# Patient Record
Sex: FEMALE | Race: OTHER | HISPANIC OR LATINO | URBAN - METROPOLITAN AREA
[De-identification: names, ages, dates, MRNs, and addresses within clinical notes are randomized per-mention and may not be internally consistent; named-entity substitution may affect disease eponyms.]

---

## 2020-04-30 ENCOUNTER — EMERGENCY (EMERGENCY)
Facility: HOSPITAL | Age: 30
LOS: 1 days | Discharge: HOME | End: 2020-04-30
Attending: EMERGENCY MEDICINE | Admitting: EMERGENCY MEDICINE
Payer: MEDICAID

## 2020-04-30 VITALS
TEMPERATURE: 99 F | OXYGEN SATURATION: 100 % | HEART RATE: 79 BPM | RESPIRATION RATE: 18 BRPM | SYSTOLIC BLOOD PRESSURE: 102 MMHG | DIASTOLIC BLOOD PRESSURE: 66 MMHG

## 2020-04-30 VITALS
TEMPERATURE: 99 F | RESPIRATION RATE: 16 BRPM | DIASTOLIC BLOOD PRESSURE: 61 MMHG | SYSTOLIC BLOOD PRESSURE: 100 MMHG | HEART RATE: 82 BPM | OXYGEN SATURATION: 100 %

## 2020-04-30 DIAGNOSIS — O26.891 OTHER SPECIFIED PREGNANCY RELATED CONDITIONS, FIRST TRIMESTER: ICD-10-CM

## 2020-04-30 LAB
ALBUMIN SERPL ELPH-MCNC: 4.4 G/DL — SIGNIFICANT CHANGE UP (ref 3.3–5)
ALP SERPL-CCNC: 54 U/L — SIGNIFICANT CHANGE UP (ref 40–120)
ALT FLD-CCNC: 11 U/L — SIGNIFICANT CHANGE UP (ref 4–33)
ANION GAP SERPL CALC-SCNC: 12 MMO/L — SIGNIFICANT CHANGE UP (ref 7–14)
APPEARANCE UR: CLEAR — SIGNIFICANT CHANGE UP
AST SERPL-CCNC: 16 U/L — SIGNIFICANT CHANGE UP (ref 4–32)
BASOPHILS # BLD AUTO: 0.02 K/UL — SIGNIFICANT CHANGE UP (ref 0–0.2)
BASOPHILS NFR BLD AUTO: 0.3 % — SIGNIFICANT CHANGE UP (ref 0–2)
BILIRUB SERPL-MCNC: 1 MG/DL — SIGNIFICANT CHANGE UP (ref 0.2–1.2)
BILIRUB UR-MCNC: NEGATIVE — SIGNIFICANT CHANGE UP
BLD GP AB SCN SERPL QL: NEGATIVE — SIGNIFICANT CHANGE UP
BLOOD UR QL VISUAL: NEGATIVE — SIGNIFICANT CHANGE UP
BUN SERPL-MCNC: 9 MG/DL — SIGNIFICANT CHANGE UP (ref 7–23)
CALCIUM SERPL-MCNC: 9.4 MG/DL — SIGNIFICANT CHANGE UP (ref 8.4–10.5)
CHLORIDE SERPL-SCNC: 103 MMOL/L — SIGNIFICANT CHANGE UP (ref 98–107)
CO2 SERPL-SCNC: 22 MMOL/L — SIGNIFICANT CHANGE UP (ref 22–31)
COLOR SPEC: YELLOW — SIGNIFICANT CHANGE UP
CREAT SERPL-MCNC: 0.63 MG/DL — SIGNIFICANT CHANGE UP (ref 0.5–1.3)
EOSINOPHIL # BLD AUTO: 0.05 K/UL — SIGNIFICANT CHANGE UP (ref 0–0.5)
EOSINOPHIL NFR BLD AUTO: 0.7 % — SIGNIFICANT CHANGE UP (ref 0–6)
GLUCOSE SERPL-MCNC: 63 MG/DL — LOW (ref 70–99)
GLUCOSE UR-MCNC: NEGATIVE — SIGNIFICANT CHANGE UP
HCG SERPL-ACNC: 8895 MIU/ML — SIGNIFICANT CHANGE UP
HCT VFR BLD CALC: 40.9 % — SIGNIFICANT CHANGE UP (ref 34.5–45)
HGB BLD-MCNC: 13.8 G/DL — SIGNIFICANT CHANGE UP (ref 11.5–15.5)
IMM GRANULOCYTES NFR BLD AUTO: 0.4 % — SIGNIFICANT CHANGE UP (ref 0–1.5)
KETONES UR-MCNC: NEGATIVE — SIGNIFICANT CHANGE UP
LEUKOCYTE ESTERASE UR-ACNC: NEGATIVE — SIGNIFICANT CHANGE UP
LYMPHOCYTES # BLD AUTO: 2.19 K/UL — SIGNIFICANT CHANGE UP (ref 1–3.3)
LYMPHOCYTES # BLD AUTO: 32.2 % — SIGNIFICANT CHANGE UP (ref 13–44)
MCHC RBC-ENTMCNC: 28.8 PG — SIGNIFICANT CHANGE UP (ref 27–34)
MCHC RBC-ENTMCNC: 33.7 % — SIGNIFICANT CHANGE UP (ref 32–36)
MCV RBC AUTO: 85.2 FL — SIGNIFICANT CHANGE UP (ref 80–100)
MONOCYTES # BLD AUTO: 0.46 K/UL — SIGNIFICANT CHANGE UP (ref 0–0.9)
MONOCYTES NFR BLD AUTO: 6.8 % — SIGNIFICANT CHANGE UP (ref 2–14)
NEUTROPHILS # BLD AUTO: 4.06 K/UL — SIGNIFICANT CHANGE UP (ref 1.8–7.4)
NEUTROPHILS NFR BLD AUTO: 59.6 % — SIGNIFICANT CHANGE UP (ref 43–77)
NITRITE UR-MCNC: NEGATIVE — SIGNIFICANT CHANGE UP
NRBC # FLD: 0 K/UL — SIGNIFICANT CHANGE UP (ref 0–0)
PH UR: 7 — SIGNIFICANT CHANGE UP (ref 5–8)
PLATELET # BLD AUTO: 224 K/UL — SIGNIFICANT CHANGE UP (ref 150–400)
PMV BLD: 9.9 FL — SIGNIFICANT CHANGE UP (ref 7–13)
POTASSIUM SERPL-MCNC: 3.9 MMOL/L — SIGNIFICANT CHANGE UP (ref 3.5–5.3)
POTASSIUM SERPL-SCNC: 3.9 MMOL/L — SIGNIFICANT CHANGE UP (ref 3.5–5.3)
PROT SERPL-MCNC: 7.1 G/DL — SIGNIFICANT CHANGE UP (ref 6–8.3)
PROT UR-MCNC: 10 — SIGNIFICANT CHANGE UP
RBC # BLD: 4.8 M/UL — SIGNIFICANT CHANGE UP (ref 3.8–5.2)
RBC # FLD: 12.7 % — SIGNIFICANT CHANGE UP (ref 10.3–14.5)
RH IG SCN BLD-IMP: POSITIVE — SIGNIFICANT CHANGE UP
SODIUM SERPL-SCNC: 137 MMOL/L — SIGNIFICANT CHANGE UP (ref 135–145)
SP GR SPEC: 1.02 — SIGNIFICANT CHANGE UP (ref 1–1.04)
UROBILINOGEN FLD QL: NORMAL — SIGNIFICANT CHANGE UP
WBC # BLD: 6.81 K/UL — SIGNIFICANT CHANGE UP (ref 3.8–10.5)
WBC # FLD AUTO: 6.81 K/UL — SIGNIFICANT CHANGE UP (ref 3.8–10.5)

## 2020-04-30 PROCEDURE — 76830 TRANSVAGINAL US NON-OB: CPT | Mod: 26

## 2020-04-30 PROCEDURE — 99284 EMERGENCY DEPT VISIT MOD MDM: CPT

## 2020-04-30 RX ORDER — ACETAMINOPHEN 500 MG
975 TABLET ORAL ONCE
Refills: 0 | Status: COMPLETED | OUTPATIENT
Start: 2020-04-30 | End: 2020-04-30

## 2020-04-30 RX ADMIN — Medication 975 MILLIGRAM(S): at 14:35

## 2020-04-30 NOTE — CONSULT NOTE ADULT - SUBJECTIVE AND OBJECTIVE BOX
Gyn Consult Note    DMITRI MORRIS  29y  Female [id number]    HPI: 30 y/o Belarusian speaking , LMP 3/24, at 5w2d by LMP, presenting with pelvic cramping in the setting of positive home pregnancy test. Patient reports two days of menstrual cramps which was concerning because she had a positive pregnancy test at home and highly desires the pregnancy. Patient has not yet established care with an OB/GYN. She denies fevers, chills, nausea, vomiting, chest pain, shortness of breath, dizziness, urinary symptoms, changes in bowel movements, urinary symptoms, vaginal bleeding, purulent vaginal discharge.     Name of GYN Physician: none currently    OBHx:  denies  GYNHx: Denies fibroids, cysts, endometriosis, STI's, Abnormal pap smears   PMH: denies  PSH: denies  Meds: PNVs  All: NKDA  Soc: no substance use, anxiety/depression    accepts blood    Vital Signs Last 24 Hrs  T(C): 37.2 (2020 18:10), Max: 37.4 (2020 14:03)  T(F): 98.9 (2020 18:10), Max: 99.4 (2020 14:03)  HR: 79 (2020 18:10) (79 - 82)  BP: 102/66 (2020 18:10) (100/61 - 102/66)  BP(mean): --  RR: 18 (2020 18:10) (16 - 18)  SpO2: 100% (2020 18:10) (100% - 100%)    Physical Exam:   General: sitting comfortably in bed, NAD   CV: RRR  Lungs: CTAB  Abd: Soft, non-tender, non-distended.  :  External labia wnl.  Bimanual exam with no cervical motion tenderness, uterus wnl, adnexa non palpable b/l.  Cervix closed, no blood on glove  Ext: non-tender b/l, no edema     LABS:                              13.8   6.81  )-----------( 224      ( 2020 14:25 )             40.9     04-30    137  |  103  |  9   ----------------------------<  63<L>  3.9   |  22  |  0.63    Ca    9.4      2020 14:25    TPro  7.1  /  Alb  4.4  /  TBili  1.0  /  DBili  x   /  AST  16  /  ALT  11  /  AlkPhos  54      I&O's Detail      Urinalysis Basic - ( 2020 14:16 )    Color: YELLOW / Appearance: CLEAR / S.024 / pH: 7.0  Gluc: NEGATIVE / Ketone: NEGATIVE  / Bili: NEGATIVE / Urobili: NORMAL   Blood: NEGATIVE / Protein: 10 / Nitrite: NEGATIVE   Leuk Esterase: NEGATIVE / RBC: x / WBC x   Sq Epi: x / Non Sq Epi: x / Bacteria: x    < from: US Transvaginal (20 @ 15:23) >    EXAM:  US TRANSVAGINAL        PROCEDURE DATE:  2020         INTERPRETATION:  CLINICAL INFORMATION: Lower abdominal pain for 2 days, dysuria and urinary frequency. Beta hCG greater than 8000.    LMP: 3/24/2020.  Estimated Gestational Age by LMP: 5 weeks 2 days.    COMPARISON: None available.    TECHNIQUE: Endovaginal pelvic sonogram.     FINDINGS:    Uterus:  A cystic structure within the endometrial cavity measures 1.0 x 0.4 x 1.2 cm, most likely representing an gestational sac.  No definite yolk sac or fetal pole is present is visualized at this time.    Right ovary: 2.2 x 2.0 x 1.5 cm. Within normal limits.  Left ovary: 3.5 x 2.0 x 2.0 cm. A 2.7 cm corpus luteum noted.  Free fluid:None.    IMPRESSION:    A cystic structure in the endometrial cavity likely representing an early gestational sac. No yolk sac or fetal pole visualized at this time. Recommend close clinical follow-up and serial beta hCGs to assess for viability.    < end of copied text >

## 2020-04-30 NOTE — ED PROVIDER NOTE - CLINICAL SUMMARY MEDICAL DECISION MAKING FREE TEXT BOX
30 y/o female, 5 weeks pregnant w/ lower abd pain x2 days- threatened ab vs ectopic vs uti- labs, ua, US, reassess 28 y/o female, 5 weeks pregnant w/ lower abd pain x2 days- threatened ab vs ectopic vs uti- labs, ua, US, reassess    Haetsephaniajeyson: pregnant 5 weeks by dates, quant nearly 9k.  my exam shows tender left adnexa. uterus, right side adnexa non tender, bladder and right and left vaginal walls non-tender.  high quant, us done, shows "possible gestational sac" but concerned b/c tender left side, small possible clot in us uterus, no yolk sac, no hr with that level quant.    gyn consulted.

## 2020-04-30 NOTE — ED PROVIDER NOTE - NSFOLLOWUPCLINICS_GEN_ALL_ED_FT
[facility]  OB/GYN & Surg  [phone] [place] [address]  Phone: [phone]  Fax:   Follow Up Time:

## 2020-04-30 NOTE — ED PROVIDER NOTE - ATTENDING CONTRIBUTION TO CARE
I performed a history and physical exam of the patient and discussed their management with the resident and /or advanced care provider. I reviewed the resident and /or ACP's note and agree with the documented findings and plan of care. My medical decison making and observations are found above.    Caio: pregnant 5 weeks by dates, quant nearly 9k.  my exam shows tender left adnexa. uterus, right side adnexa non tender, bladder and right and left vaginal walls non-tender.  high quant, us done, shows "possible gestational sac" but concerned b/c tender left side, small possible clot in us uterus, no yolk sac, no hr with that level quant.    gyn consulted.

## 2020-04-30 NOTE — ED PROVIDER NOTE - OBJECTIVE STATEMENT
30 y/o 5 weeks pregnant, LMP March 24th c/o lower abd pain x2 days. Pt admits to intermittent lower abd pain, no aggravating or relieving factors. Pt also admits to dysuria and urinary frequency. Pt denies taking any OTC meds. Denies chest pain, sob, cough, n/v/d, vaginal bleeding, numbness, tingling, weakness, dizziness, syncope, fever or chills.

## 2020-04-30 NOTE — ED ADULT TRIAGE NOTE - CHIEF COMPLAINT QUOTE
Approx 5 weeks pregnant c/o mid-pelvic pain since yesterday, has not yet had US. No vag bleeding/ dizziness/ N/V/D/ urinary symptoms.

## 2020-04-30 NOTE — CONSULT NOTE ADULT - ASSESSMENT
28 y/o Bulgarian speaking , LMP 3/24, at 5w2d by LMP, presenting with pelvic cramping in the setting of positive home pregnancy test. VSS. H/h wnl. [facility]. TVUS with gestational sac. Yolk sac also visible - reviewed with GYN service attending. No vaginal bleeding, cervix closed. No lateral pelvic pain, no free fluid on sono. Possible threatened .

## 2020-04-30 NOTE — ED PROVIDER NOTE - PATIENT PORTAL LINK FT
You can access the FollowMyHealth Patient Portal offered by [facility] by registering at the following website: [email or web address]. By joining [facility]’s FollowMyHealth portal, you will also be able to view your health information using other applications (apps) compatible with our system. You can access the FollowMyHealth Patient Portal offered by [facility] by registering at the following website: [email or web address]. By joining [facility]’s FollowMyHealth portal, you will also be able to view your health information using other applications (apps) compatible with our system.

## 2020-04-30 NOTE — ED PROVIDER NOTE - PROGRESS NOTE DETAILS
TOM Alarcon- Pt seen and eval by GYN, cleared for dc with outpatient f/u in their clinic for repeat beta and US. Pt si stable for dc.

## 2020-05-01 PROBLEM — Z78.9 OTHER SPECIFIED HEALTH STATUS: Chronic | Status: ACTIVE | Noted: 2020-04-30

## 2020-05-05 ENCOUNTER — LABORATORY RESULT (OUTPATIENT)
Age: 30
End: 2020-05-05

## 2020-05-05 ENCOUNTER — OUTPATIENT (OUTPATIENT)
Dept: OUTPATIENT SERVICES | Facility: HOSPITAL | Age: 30
LOS: 1 days | End: 2020-05-05

## 2020-05-05 ENCOUNTER — APPOINTMENT (OUTPATIENT)
Dept: OBGYN | Facility: HOSPITAL | Age: 30
End: 2020-05-05
Payer: SELF-PAY

## 2020-05-05 VITALS
HEIGHT: 63 IN | HEART RATE: 73 BPM | WEIGHT: 118 LBS | TEMPERATURE: 98.1 F | DIASTOLIC BLOOD PRESSURE: 60 MMHG | SYSTOLIC BLOOD PRESSURE: 102 MMHG | BODY MASS INDEX: 20.91 KG/M2

## 2020-05-05 DIAGNOSIS — Z98.890 OTHER SPECIFIED POSTPROCEDURAL STATES: ICD-10-CM

## 2020-05-05 LAB — HCG SERPL-ACNC: SIGNIFICANT CHANGE UP MIU/ML

## 2020-05-05 PROCEDURE — 99213 OFFICE O/P EST LOW 20 MIN: CPT | Mod: GC

## 2020-05-06 DIAGNOSIS — Z98.890 OTHER SPECIFIED POSTPROCEDURAL STATES: ICD-10-CM

## 2020-05-06 DIAGNOSIS — Z34.90 ENCOUNTER FOR SUPERVISION OF NORMAL PREGNANCY, UNSPECIFIED, UNSPECIFIED TRIMESTER: ICD-10-CM

## 2020-05-07 NOTE — PHYSICAL EXAM
[No Lesions] : no genitalia lesions [Labia Majora] : labia major [Normal] : vagina [Labia Minora] : labia minora [Vulvar Atrophy] : no vulvar atrophy

## 2020-05-07 NOTE — PROCEDURE
[Intrauterine Pregnancy] : intrauterine pregnancy [Yolk Sac] : yolk sac present [Fetal Heart] : fetal heart present [Current GA by Sonogram: ___ (wks)] : Current GA by Sonogram: [unfilled]Uwks [Date: ___] : EDC: [unfilled] [CRL: ___ (mm)] : CRL - [unfilled]Umm [___ day(s)] : [unfilled] days [WNL] : Transvaginal OB Sonogram WNL [FreeTextEntry1] : LMP 3/24; EDC 12/29/2020 based on LMP, consistent with early first trimester ultrasound

## 2020-05-07 NOTE — CHIEF COMPLAINT
[FreeTextEntry1] : Patient is a 29 year old , LMP  who presents for follow up from ED for abdominal pain. Serum bHCG at that time on  was 8895, TVUS showed likely IUP with visualized gestational and yolk sac. bHCG today is 12578 and TVUS shows well formed yolk sac, fetal pole, and FHR of 134 bmp. This is a  desired pregnancy. She denies any current abdominal pain, vaginal bleeding, nausea, vomiting. She has been taking prenatal vitamins. \par \par

## 2020-05-07 NOTE — REVIEW OF SYSTEMS
[Chills] : no chills [Dyspnea] : no shortness of breath [Abdominal Pain] : no abdominal pain [Dysuria] : no dysuria [Abn Vag Bleeding] : no abnormal vaginal bleeding [Pelvic Pain] : no pelvic pain [Urgency] : no urgency

## 2020-06-02 ENCOUNTER — NON-APPOINTMENT (OUTPATIENT)
Age: 30
End: 2020-06-02

## 2020-06-02 ENCOUNTER — LABORATORY RESULT (OUTPATIENT)
Age: 30
End: 2020-06-02

## 2020-06-02 ENCOUNTER — OUTPATIENT (OUTPATIENT)
Dept: OUTPATIENT SERVICES | Facility: HOSPITAL | Age: 30
LOS: 1 days | End: 2020-06-02
Payer: MEDICAID

## 2020-06-02 ENCOUNTER — RESULT REVIEW (OUTPATIENT)
Age: 30
End: 2020-06-02

## 2020-06-02 ENCOUNTER — APPOINTMENT (OUTPATIENT)
Dept: OBGYN | Facility: HOSPITAL | Age: 30
End: 2020-06-02

## 2020-06-02 VITALS
SYSTOLIC BLOOD PRESSURE: 111 MMHG | BODY MASS INDEX: 21.97 KG/M2 | HEIGHT: 63 IN | DIASTOLIC BLOOD PRESSURE: 53 MMHG | WEIGHT: 124 LBS | TEMPERATURE: 98.2 F | HEART RATE: 81 BPM

## 2020-06-02 DIAGNOSIS — Z78.9 OTHER SPECIFIED HEALTH STATUS: ICD-10-CM

## 2020-06-02 DIAGNOSIS — Z83.49 FAMILY HISTORY OF OTHER ENDOCRINE, NUTRITIONAL AND METABOLIC DISEASES: ICD-10-CM

## 2020-06-02 DIAGNOSIS — Z82.61 FAMILY HISTORY OF ARTHRITIS: ICD-10-CM

## 2020-06-02 DIAGNOSIS — Z82.49 FAMILY HISTORY OF ISCHEMIC HEART DISEASE AND OTHER DISEASES OF THE CIRCULATORY SYSTEM: ICD-10-CM

## 2020-06-02 LAB
24R-OH-CALCIDIOL SERPL-MCNC: 28.7 NG/ML — LOW (ref 30–80)
APPEARANCE UR: CLEAR — SIGNIFICANT CHANGE UP
BILIRUB UR-MCNC: NEGATIVE — SIGNIFICANT CHANGE UP
BLOOD UR QL VISUAL: NEGATIVE — SIGNIFICANT CHANGE UP
COLOR SPEC: COLORLESS — SIGNIFICANT CHANGE UP
GLUCOSE UR-MCNC: NEGATIVE — SIGNIFICANT CHANGE UP
HBV SURFACE AG SER-ACNC: NEGATIVE — SIGNIFICANT CHANGE UP
KETONES UR-MCNC: NEGATIVE — SIGNIFICANT CHANGE UP
LEUKOCYTE ESTERASE UR-ACNC: NEGATIVE — SIGNIFICANT CHANGE UP
NITRITE UR-MCNC: NEGATIVE — SIGNIFICANT CHANGE UP
PH UR: 6.5 — SIGNIFICANT CHANGE UP (ref 5–8)
PROT UR-MCNC: NEGATIVE — SIGNIFICANT CHANGE UP
SP GR SPEC: 1.01 — SIGNIFICANT CHANGE UP (ref 1–1.04)
T4 FREE SERPL-MCNC: 1.39 NG/DL — SIGNIFICANT CHANGE UP (ref 0.9–1.8)
TSH SERPL-MCNC: 3.51 UIU/ML — SIGNIFICANT CHANGE UP (ref 0.27–4.2)
URATE SERPL-MCNC: 3.5 MG/DL — SIGNIFICANT CHANGE UP (ref 2.5–7)
UROBILINOGEN FLD QL: NORMAL — SIGNIFICANT CHANGE UP

## 2020-06-02 PROCEDURE — G0452: CPT | Mod: 26

## 2020-06-02 RX ORDER — FAMOTIDINE 10 MG/1
10 TABLET, FILM COATED ORAL
Qty: 30 | Refills: 1 | Status: COMPLETED | COMMUNITY
Start: 2020-06-02 | End: 2020-07-02

## 2020-06-03 LAB
C TRACH RRNA SPEC QL NAA+PROBE: SIGNIFICANT CHANGE UP
HCV RNA SERPL NAA DL=5-ACNC: NOT DETECTED IU/ML — SIGNIFICANT CHANGE UP
HCV RNA SPEC NAA+PROBE-LOG IU: SIGNIFICANT CHANGE UP
HGB A MFR BLD: 97.2 % — SIGNIFICANT CHANGE UP
HGB A2 MFR BLD: 2.5 % — SIGNIFICANT CHANGE UP (ref 2.4–3.5)
HGB ELECT COMMENT: SIGNIFICANT CHANGE UP
HGB F MFR BLD: < 1 % — SIGNIFICANT CHANGE UP (ref 0–1.5)
HIV 1+2 AB+HIV1 P24 AG SERPL QL IA: SIGNIFICANT CHANGE UP
HPV HIGH+LOW RISK DNA PNL CVX: SIGNIFICANT CHANGE UP
LEAD SERPL-MCNC: < 1 UG/DL — SIGNIFICANT CHANGE UP (ref 0–4)
N GONORRHOEA RRNA SPEC QL NAA+PROBE: SIGNIFICANT CHANGE UP
RUBV IGG SER-ACNC: 6.6 INDEX — SIGNIFICANT CHANGE UP
RUBV IGG SER-IMP: POSITIVE — SIGNIFICANT CHANGE UP
SPECIMEN SOURCE: SIGNIFICANT CHANGE UP
T PALLIDUM AB TITR SER: NEGATIVE — SIGNIFICANT CHANGE UP
VZV IGG FLD QL IA: 37.4 INDEX — SIGNIFICANT CHANGE UP
VZV IGG FLD QL IA: NEGATIVE — SIGNIFICANT CHANGE UP

## 2020-06-04 ENCOUNTER — NON-APPOINTMENT (OUTPATIENT)
Age: 30
End: 2020-06-04

## 2020-06-04 LAB
-  AMIKACIN: SIGNIFICANT CHANGE UP
-  AMPICILLIN/SULBACTAM: SIGNIFICANT CHANGE UP
-  AMPICILLIN: SIGNIFICANT CHANGE UP
-  AZTREONAM: SIGNIFICANT CHANGE UP
-  CEFAZOLIN: SIGNIFICANT CHANGE UP
-  CEFEPIME: SIGNIFICANT CHANGE UP
-  CEFOXITIN: SIGNIFICANT CHANGE UP
-  CEFTRIAXONE: SIGNIFICANT CHANGE UP
-  CIPROFLOXACIN: SIGNIFICANT CHANGE UP
-  GENTAMICIN: SIGNIFICANT CHANGE UP
-  IMIPENEM: SIGNIFICANT CHANGE UP
-  LEVOFLOXACIN: SIGNIFICANT CHANGE UP
-  MEROPENEM: SIGNIFICANT CHANGE UP
-  NITROFURANTOIN: SIGNIFICANT CHANGE UP
-  PIPERACILLIN/TAZOBACTAM: SIGNIFICANT CHANGE UP
-  TIGECYCLINE: SIGNIFICANT CHANGE UP
-  TOBRAMYCIN: SIGNIFICANT CHANGE UP
-  TRIMETHOPRIM/SULFAMETHOXAZOLE: SIGNIFICANT CHANGE UP
CULTURE RESULTS: SIGNIFICANT CHANGE UP
CYTOLOGY SPEC DOC CYTO: SIGNIFICANT CHANGE UP
GAMMA INTERFERON BACKGROUND BLD IA-ACNC: 0.05 IU/ML — SIGNIFICANT CHANGE UP
M TB IFN-G BLD-IMP: NEGATIVE — SIGNIFICANT CHANGE UP
M TB IFN-G CD4+ BCKGRND COR BLD-ACNC: 0 IU/ML — SIGNIFICANT CHANGE UP
M TB IFN-G CD4+CD8+ BCKGRND COR BLD-ACNC: -0.01 IU/ML — SIGNIFICANT CHANGE UP
METHOD TYPE: SIGNIFICANT CHANGE UP
MEV IGM SER-ACNC: NEGATIVE — SIGNIFICANT CHANGE UP
ORGANISM # SPEC MICROSCOPIC CNT: SIGNIFICANT CHANGE UP
ORGANISM # SPEC MICROSCOPIC CNT: SIGNIFICANT CHANGE UP
QUANT TB PLUS MITOGEN MINUS NIL: 7.8 IU/ML — SIGNIFICANT CHANGE UP
SPECIMEN SOURCE: SIGNIFICANT CHANGE UP

## 2020-06-08 LAB — CFTR MUT ANL BLD/T: NEGATIVE — SIGNIFICANT CHANGE UP

## 2020-06-09 DIAGNOSIS — Z34.01 ENCOUNTER FOR SUPERVISION OF NORMAL FIRST PREGNANCY, FIRST TRIMESTER: ICD-10-CM

## 2020-06-09 DIAGNOSIS — Z34.90 ENCOUNTER FOR SUPERVISION OF NORMAL PREGNANCY, UNSPECIFIED, UNSPECIFIED TRIMESTER: ICD-10-CM

## 2020-06-09 DIAGNOSIS — K29.70 GASTRITIS, UNSPECIFIED, WITHOUT BLEEDING: ICD-10-CM

## 2020-06-09 DIAGNOSIS — Z83.49 FAMILY HISTORY OF OTHER ENDOCRINE, NUTRITIONAL AND METABOLIC DISEASES: ICD-10-CM

## 2020-06-18 ENCOUNTER — APPOINTMENT (OUTPATIENT)
Dept: ANTEPARTUM | Facility: CLINIC | Age: 30
End: 2020-06-18
Payer: MEDICAID

## 2020-06-18 ENCOUNTER — LABORATORY RESULT (OUTPATIENT)
Age: 30
End: 2020-06-18

## 2020-06-18 ENCOUNTER — ASOB RESULT (OUTPATIENT)
Age: 30
End: 2020-06-18

## 2020-06-18 PROCEDURE — 76813 OB US NUCHAL MEAS 1 GEST: CPT | Mod: 26

## 2020-06-18 PROCEDURE — 76801 OB US < 14 WKS SINGLE FETUS: CPT | Mod: 26

## 2020-06-22 LAB
1ST TRIMESTER DATA: SIGNIFICANT CHANGE UP
ADDENDUM DOC: SIGNIFICANT CHANGE UP
AFP SERPL-ACNC: SIGNIFICANT CHANGE UP
B-HCG FREE SERPL-MCNC: SIGNIFICANT CHANGE UP
CLINICAL BIOCHEMIST REVIEW: SIGNIFICANT CHANGE UP
CLINICAL BIOCHEMIST REVIEW: SIGNIFICANT CHANGE UP
DEMOGRAPHIC DATA: SIGNIFICANT CHANGE UP
NT: SIGNIFICANT CHANGE UP
PAPP-A SERPL-ACNC: SIGNIFICANT CHANGE UP
SCREEN-FOOTER: SIGNIFICANT CHANGE UP
SCREEN-RECOMMENDATIONS: SIGNIFICANT CHANGE UP

## 2020-06-30 ENCOUNTER — APPOINTMENT (OUTPATIENT)
Dept: OBGYN | Facility: HOSPITAL | Age: 30
End: 2020-06-30

## 2020-06-30 ENCOUNTER — OUTPATIENT (OUTPATIENT)
Dept: OUTPATIENT SERVICES | Facility: HOSPITAL | Age: 30
LOS: 1 days | End: 2020-06-30

## 2020-06-30 ENCOUNTER — NON-APPOINTMENT (OUTPATIENT)
Age: 30
End: 2020-06-30

## 2020-06-30 DIAGNOSIS — Z87.440 PERSONAL HISTORY OF URINARY (TRACT) INFECTIONS: ICD-10-CM

## 2020-07-01 DIAGNOSIS — K29.70 GASTRITIS, UNSPECIFIED, WITHOUT BLEEDING: ICD-10-CM

## 2020-07-01 DIAGNOSIS — N39.0 URINARY TRACT INFECTION, SITE NOT SPECIFIED: ICD-10-CM

## 2020-07-01 DIAGNOSIS — Z34.01 ENCOUNTER FOR SUPERVISION OF NORMAL FIRST PREGNANCY, FIRST TRIMESTER: ICD-10-CM

## 2020-07-22 ENCOUNTER — APPOINTMENT (OUTPATIENT)
Dept: PEDIATRIC MEDICAL GENETICS | Facility: CLINIC | Age: 30
End: 2020-07-22
Payer: MEDICAID

## 2020-07-22 PROCEDURE — 99205 OFFICE O/P NEW HI 60 MIN: CPT | Mod: 95

## 2020-07-24 ENCOUNTER — LABORATORY RESULT (OUTPATIENT)
Age: 30
End: 2020-07-24

## 2020-07-25 NOTE — HISTORY OF PRESENT ILLNESS
[Home] : at home, [unfilled] , at the time of the visit. [Other Location: e.g. Home (Enter Location, City,State)___] : at [unfilled] [Spouse] : spouse [Other:____] : [unfilled]

## 2020-07-28 ENCOUNTER — LABORATORY RESULT (OUTPATIENT)
Age: 30
End: 2020-07-28

## 2020-07-28 ENCOUNTER — APPOINTMENT (OUTPATIENT)
Dept: OBGYN | Facility: HOSPITAL | Age: 30
End: 2020-07-28

## 2020-07-28 ENCOUNTER — OUTPATIENT (OUTPATIENT)
Dept: OUTPATIENT SERVICES | Facility: HOSPITAL | Age: 30
LOS: 1 days | End: 2020-07-28

## 2020-07-28 ENCOUNTER — NON-APPOINTMENT (OUTPATIENT)
Age: 30
End: 2020-07-28

## 2020-07-28 ENCOUNTER — RESULT REVIEW (OUTPATIENT)
Age: 30
End: 2020-07-28

## 2020-07-28 VITALS
DIASTOLIC BLOOD PRESSURE: 65 MMHG | HEART RATE: 80 BPM | SYSTOLIC BLOOD PRESSURE: 98 MMHG | BODY MASS INDEX: 22.67 KG/M2 | WEIGHT: 128 LBS

## 2020-07-28 DIAGNOSIS — R94.6 ABNORMAL RESULTS OF THYROID FUNCTION STUDIES: ICD-10-CM

## 2020-07-28 DIAGNOSIS — Z34.01 ENCOUNTER FOR SUPERVISION OF NORMAL FIRST PREGNANCY, FIRST TRIMESTER: ICD-10-CM

## 2020-07-28 DIAGNOSIS — R79.89 OTHER SPECIFIED ABNORMAL FINDINGS OF BLOOD CHEMISTRY: ICD-10-CM

## 2020-07-28 LAB
SARS-COV-2 IGG SERPL QL IA: NEGATIVE — SIGNIFICANT CHANGE UP
SARS-COV-2 IGM SERPL IA-ACNC: 0.01 INDEX — SIGNIFICANT CHANGE UP
T4 FREE SERPL-MCNC: 1.22 NG/DL — SIGNIFICANT CHANGE UP (ref 0.9–1.8)
TSH SERPL-MCNC: 4.06 UIU/ML — SIGNIFICANT CHANGE UP (ref 0.27–4.2)

## 2020-07-29 LAB
CULTURE RESULTS: SIGNIFICANT CHANGE UP
SPECIMEN SOURCE: SIGNIFICANT CHANGE UP

## 2020-07-31 LAB
1ST TRIMESTER DATA: SIGNIFICANT CHANGE UP
2ND TRIMESTER DATA: SIGNIFICANT CHANGE UP
AFP SERPL-ACNC: SIGNIFICANT CHANGE UP
AFP SERPL-ACNC: SIGNIFICANT CHANGE UP
B-HCG FREE SERPL-MCNC: SIGNIFICANT CHANGE UP
B-HCG FREE SERPL-MCNC: SIGNIFICANT CHANGE UP
CLINICAL BIOCHEMIST REVIEW: SIGNIFICANT CHANGE UP
DEMOGRAPHIC DATA: SIGNIFICANT CHANGE UP
INHIBIN A SERPL-MCNC: SIGNIFICANT CHANGE UP
NT: SIGNIFICANT CHANGE UP
PAPP-A SERPL-ACNC: SIGNIFICANT CHANGE UP
SCREEN-FOOTER: SIGNIFICANT CHANGE UP
U ESTRIOL SERPL-SCNC: SIGNIFICANT CHANGE UP

## 2020-08-11 LAB — MISCELLANEOUS - CHEM: SIGNIFICANT CHANGE UP

## 2020-08-25 ENCOUNTER — LABORATORY RESULT (OUTPATIENT)
Age: 30
End: 2020-08-25

## 2020-08-25 ENCOUNTER — NON-APPOINTMENT (OUTPATIENT)
Age: 30
End: 2020-08-25

## 2020-08-25 ENCOUNTER — OUTPATIENT (OUTPATIENT)
Dept: OUTPATIENT SERVICES | Facility: HOSPITAL | Age: 30
LOS: 1 days | End: 2020-08-25

## 2020-08-25 ENCOUNTER — APPOINTMENT (OUTPATIENT)
Dept: OBGYN | Facility: HOSPITAL | Age: 30
End: 2020-08-25

## 2020-08-25 VITALS
SYSTOLIC BLOOD PRESSURE: 107 MMHG | TEMPERATURE: 97.5 F | BODY MASS INDEX: 23.57 KG/M2 | DIASTOLIC BLOOD PRESSURE: 61 MMHG | WEIGHT: 133 LBS | HEIGHT: 63 IN | HEART RATE: 96 BPM

## 2020-08-25 LAB
BASOPHILS # BLD AUTO: 0.04 K/UL — SIGNIFICANT CHANGE UP (ref 0–0.2)
BASOPHILS NFR BLD AUTO: 0.4 % — SIGNIFICANT CHANGE UP (ref 0–2)
EOSINOPHIL # BLD AUTO: 0.09 K/UL — SIGNIFICANT CHANGE UP (ref 0–0.5)
EOSINOPHIL NFR BLD AUTO: 0.8 % — SIGNIFICANT CHANGE UP (ref 0–6)
HCT VFR BLD CALC: 33.1 % — LOW (ref 34.5–45)
HGB BLD-MCNC: 11 G/DL — LOW (ref 11.5–15.5)
IMM GRANULOCYTES NFR BLD AUTO: 1.9 % — HIGH (ref 0–1.5)
LYMPHOCYTES # BLD AUTO: 2.32 K/UL — SIGNIFICANT CHANGE UP (ref 1–3.3)
LYMPHOCYTES # BLD AUTO: 21.2 % — SIGNIFICANT CHANGE UP (ref 13–44)
MCHC RBC-ENTMCNC: 30.6 PG — SIGNIFICANT CHANGE UP (ref 27–34)
MCHC RBC-ENTMCNC: 33.2 % — SIGNIFICANT CHANGE UP (ref 32–36)
MCV RBC AUTO: 91.9 FL — SIGNIFICANT CHANGE UP (ref 80–100)
MONOCYTES # BLD AUTO: 0.86 K/UL — SIGNIFICANT CHANGE UP (ref 0–0.9)
MONOCYTES NFR BLD AUTO: 7.9 % — SIGNIFICANT CHANGE UP (ref 2–14)
NEUTROPHILS # BLD AUTO: 7.4 K/UL — SIGNIFICANT CHANGE UP (ref 1.8–7.4)
NEUTROPHILS NFR BLD AUTO: 67.8 % — SIGNIFICANT CHANGE UP (ref 43–77)
NRBC # FLD: 0 K/UL — SIGNIFICANT CHANGE UP (ref 0–0)
PLATELET # BLD AUTO: 230 K/UL — SIGNIFICANT CHANGE UP (ref 150–400)
PMV BLD: 10.1 FL — SIGNIFICANT CHANGE UP (ref 7–13)
RBC # BLD: 3.6 M/UL — LOW (ref 3.8–5.2)
RBC # FLD: 13.3 % — SIGNIFICANT CHANGE UP (ref 10.3–14.5)
WBC # BLD: 10.92 K/UL — HIGH (ref 3.8–10.5)
WBC # FLD AUTO: 10.92 K/UL — HIGH (ref 3.8–10.5)

## 2020-08-27 ENCOUNTER — APPOINTMENT (OUTPATIENT)
Dept: ANTEPARTUM | Facility: CLINIC | Age: 30
End: 2020-08-27

## 2020-08-28 DIAGNOSIS — Z34.02 ENCOUNTER FOR SUPERVISION OF NORMAL FIRST PREGNANCY, SECOND TRIMESTER: ICD-10-CM

## 2020-09-04 ENCOUNTER — ASOB RESULT (OUTPATIENT)
Age: 30
End: 2020-09-04

## 2020-09-04 ENCOUNTER — APPOINTMENT (OUTPATIENT)
Dept: ANTEPARTUM | Facility: CLINIC | Age: 30
End: 2020-09-04
Payer: MEDICAID

## 2020-09-04 PROCEDURE — 76805 OB US >/= 14 WKS SNGL FETUS: CPT | Mod: 26

## 2020-09-22 ENCOUNTER — LABORATORY RESULT (OUTPATIENT)
Age: 30
End: 2020-09-22

## 2020-09-22 ENCOUNTER — OUTPATIENT (OUTPATIENT)
Dept: OUTPATIENT SERVICES | Facility: HOSPITAL | Age: 30
LOS: 1 days | End: 2020-09-22

## 2020-09-22 ENCOUNTER — RESULT REVIEW (OUTPATIENT)
Age: 30
End: 2020-09-22

## 2020-09-22 ENCOUNTER — NON-APPOINTMENT (OUTPATIENT)
Age: 30
End: 2020-09-22

## 2020-09-22 ENCOUNTER — APPOINTMENT (OUTPATIENT)
Dept: OBGYN | Facility: HOSPITAL | Age: 30
End: 2020-09-22

## 2020-09-22 VITALS
HEIGHT: 63 IN | HEART RATE: 92 BPM | DIASTOLIC BLOOD PRESSURE: 61 MMHG | BODY MASS INDEX: 25.16 KG/M2 | WEIGHT: 142 LBS | SYSTOLIC BLOOD PRESSURE: 99 MMHG

## 2020-09-22 LAB
BASOPHILS # BLD AUTO: 0.03 K/UL — SIGNIFICANT CHANGE UP (ref 0–0.2)
BASOPHILS NFR BLD AUTO: 0.3 % — SIGNIFICANT CHANGE UP (ref 0–2)
EOSINOPHIL # BLD AUTO: 0.04 K/UL — SIGNIFICANT CHANGE UP (ref 0–0.5)
EOSINOPHIL NFR BLD AUTO: 0.4 % — SIGNIFICANT CHANGE UP (ref 0–6)
GLUCOSE PRE/P GLC SERPL-SCNC: 104 — SIGNIFICANT CHANGE UP (ref 65–115)
HCT VFR BLD CALC: 32.3 % — LOW (ref 34.5–45)
HGB BLD-MCNC: 10.8 G/DL — LOW (ref 11.5–15.5)
IMM GRANULOCYTES NFR BLD AUTO: 2 % — HIGH (ref 0–1.5)
LYMPHOCYTES # BLD AUTO: 1.89 K/UL — SIGNIFICANT CHANGE UP (ref 1–3.3)
LYMPHOCYTES # BLD AUTO: 19.1 % — SIGNIFICANT CHANGE UP (ref 13–44)
MCHC RBC-ENTMCNC: 30.1 PG — SIGNIFICANT CHANGE UP (ref 27–34)
MCHC RBC-ENTMCNC: 33.4 % — SIGNIFICANT CHANGE UP (ref 32–36)
MCV RBC AUTO: 90 FL — SIGNIFICANT CHANGE UP (ref 80–100)
MONOCYTES # BLD AUTO: 0.6 K/UL — SIGNIFICANT CHANGE UP (ref 0–0.9)
MONOCYTES NFR BLD AUTO: 6.1 % — SIGNIFICANT CHANGE UP (ref 2–14)
NEUTROPHILS # BLD AUTO: 7.14 K/UL — SIGNIFICANT CHANGE UP (ref 1.8–7.4)
NEUTROPHILS NFR BLD AUTO: 72.1 % — SIGNIFICANT CHANGE UP (ref 43–77)
NRBC # FLD: 0 K/UL — SIGNIFICANT CHANGE UP (ref 0–0)
PLATELET # BLD AUTO: 226 K/UL — SIGNIFICANT CHANGE UP (ref 150–400)
PMV BLD: 9.5 FL — SIGNIFICANT CHANGE UP (ref 7–13)
RBC # BLD: 3.59 M/UL — LOW (ref 3.8–5.2)
RBC # FLD: 12.8 % — SIGNIFICANT CHANGE UP (ref 10.3–14.5)
WBC # BLD: 9.9 K/UL — SIGNIFICANT CHANGE UP (ref 3.8–10.5)
WBC # FLD AUTO: 9.9 K/UL — SIGNIFICANT CHANGE UP (ref 3.8–10.5)

## 2020-09-23 LAB
24R-OH-CALCIDIOL SERPL-MCNC: 31.8 NG/ML — SIGNIFICANT CHANGE UP (ref 30–80)
CULTURE RESULTS: SIGNIFICANT CHANGE UP
SPECIMEN SOURCE: SIGNIFICANT CHANGE UP
T PALLIDUM AB TITR SER: NEGATIVE — SIGNIFICANT CHANGE UP

## 2020-09-29 DIAGNOSIS — Z34.03 ENCOUNTER FOR SUPERVISION OF NORMAL FIRST PREGNANCY, THIRD TRIMESTER: ICD-10-CM

## 2020-09-29 DIAGNOSIS — R30.0 DYSURIA: ICD-10-CM

## 2020-10-12 ENCOUNTER — APPOINTMENT (OUTPATIENT)
Dept: ANTEPARTUM | Facility: CLINIC | Age: 30
End: 2020-10-12
Payer: MEDICAID

## 2020-10-12 ENCOUNTER — ASOB RESULT (OUTPATIENT)
Age: 30
End: 2020-10-12

## 2020-10-12 PROCEDURE — 76819 FETAL BIOPHYS PROFIL W/O NST: CPT | Mod: 26

## 2020-10-12 PROCEDURE — 76816 OB US FOLLOW-UP PER FETUS: CPT | Mod: 26

## 2020-10-13 ENCOUNTER — NON-APPOINTMENT (OUTPATIENT)
Age: 30
End: 2020-10-13

## 2020-10-13 ENCOUNTER — MED ADMIN CHARGE (OUTPATIENT)
Age: 30
End: 2020-10-13

## 2020-10-13 ENCOUNTER — OUTPATIENT (OUTPATIENT)
Dept: OUTPATIENT SERVICES | Facility: HOSPITAL | Age: 30
LOS: 1 days | End: 2020-10-13

## 2020-10-13 ENCOUNTER — APPOINTMENT (OUTPATIENT)
Dept: OBGYN | Facility: HOSPITAL | Age: 30
End: 2020-10-13

## 2020-10-13 VITALS
TEMPERATURE: 97.9 F | WEIGHT: 144 LBS | SYSTOLIC BLOOD PRESSURE: 103 MMHG | BODY MASS INDEX: 25.52 KG/M2 | DIASTOLIC BLOOD PRESSURE: 57 MMHG | HEART RATE: 88 BPM | HEIGHT: 63 IN

## 2020-10-13 DIAGNOSIS — Z34.01 ENCOUNTER FOR SUPERVISION OF NORMAL FIRST PREGNANCY, FIRST TRIMESTER: ICD-10-CM

## 2020-10-13 DIAGNOSIS — Z34.02 ENCOUNTER FOR SUPERVISION OF NORMAL FIRST PREGNANCY, SECOND TRIMESTER: ICD-10-CM

## 2020-10-13 RX ORDER — CEPHALEXIN 500 MG/1
500 CAPSULE ORAL 4 TIMES DAILY
Qty: 28 | Refills: 0 | Status: COMPLETED | COMMUNITY
Start: 2020-06-30 | End: 2020-07-07

## 2020-10-13 RX ORDER — POLYETHYLENE GLYCOL 3350 17 G/17G
17 POWDER, FOR SOLUTION ORAL DAILY
Qty: 1 | Refills: 0 | Status: COMPLETED | COMMUNITY
Start: 2020-10-13 | End: 2020-10-27

## 2020-10-13 RX ORDER — CEPHALEXIN 500 MG/1
500 CAPSULE ORAL 4 TIMES DAILY
Qty: 28 | Refills: 0 | Status: COMPLETED | COMMUNITY
Start: 2020-06-05 | End: 2020-06-12

## 2020-10-14 DIAGNOSIS — Z23 ENCOUNTER FOR IMMUNIZATION: ICD-10-CM

## 2020-10-14 DIAGNOSIS — O99.613 DISEASES OF THE DIGESTIVE SYSTEM COMPLICATING PREGNANCY, THIRD TRIMESTER: ICD-10-CM

## 2020-11-03 ENCOUNTER — MED ADMIN CHARGE (OUTPATIENT)
Age: 30
End: 2020-11-03

## 2020-11-03 ENCOUNTER — LABORATORY RESULT (OUTPATIENT)
Age: 30
End: 2020-11-03

## 2020-11-03 ENCOUNTER — OUTPATIENT (OUTPATIENT)
Dept: OUTPATIENT SERVICES | Facility: HOSPITAL | Age: 30
LOS: 1 days | End: 2020-11-03

## 2020-11-03 ENCOUNTER — APPOINTMENT (OUTPATIENT)
Dept: OBGYN | Facility: HOSPITAL | Age: 30
End: 2020-11-03

## 2020-11-03 ENCOUNTER — NON-APPOINTMENT (OUTPATIENT)
Age: 30
End: 2020-11-03

## 2020-11-03 VITALS
HEIGHT: 63 IN | BODY MASS INDEX: 26.05 KG/M2 | WEIGHT: 147 LBS | SYSTOLIC BLOOD PRESSURE: 113 MMHG | HEART RATE: 91 BPM | DIASTOLIC BLOOD PRESSURE: 59 MMHG | TEMPERATURE: 98.1 F

## 2020-11-03 LAB
BASOPHILS # BLD AUTO: 0.03 K/UL — SIGNIFICANT CHANGE UP (ref 0–0.2)
BASOPHILS NFR BLD AUTO: 0.3 % — SIGNIFICANT CHANGE UP (ref 0–2)
EOSINOPHIL # BLD AUTO: 0.09 K/UL — SIGNIFICANT CHANGE UP (ref 0–0.5)
EOSINOPHIL NFR BLD AUTO: 0.9 % — SIGNIFICANT CHANGE UP (ref 0–6)
HCT VFR BLD CALC: 33.3 % — LOW (ref 34.5–45)
HGB BLD-MCNC: 10.8 G/DL — LOW (ref 11.5–15.5)
IMM GRANULOCYTES NFR BLD AUTO: 2.6 % — HIGH (ref 0–1.5)
LYMPHOCYTES # BLD AUTO: 2.41 K/UL — SIGNIFICANT CHANGE UP (ref 1–3.3)
LYMPHOCYTES # BLD AUTO: 23.7 % — SIGNIFICANT CHANGE UP (ref 13–44)
MCHC RBC-ENTMCNC: 29.4 PG — SIGNIFICANT CHANGE UP (ref 27–34)
MCHC RBC-ENTMCNC: 32.4 % — SIGNIFICANT CHANGE UP (ref 32–36)
MCV RBC AUTO: 90.7 FL — SIGNIFICANT CHANGE UP (ref 80–100)
MONOCYTES # BLD AUTO: 0.85 K/UL — SIGNIFICANT CHANGE UP (ref 0–0.9)
MONOCYTES NFR BLD AUTO: 8.4 % — SIGNIFICANT CHANGE UP (ref 2–14)
NEUTROPHILS # BLD AUTO: 6.53 K/UL — SIGNIFICANT CHANGE UP (ref 1.8–7.4)
NEUTROPHILS NFR BLD AUTO: 64.1 % — SIGNIFICANT CHANGE UP (ref 43–77)
NRBC # FLD: 0 K/UL — SIGNIFICANT CHANGE UP (ref 0–0)
PLATELET # BLD AUTO: 232 K/UL — SIGNIFICANT CHANGE UP (ref 150–400)
PMV BLD: 10.9 FL — SIGNIFICANT CHANGE UP (ref 7–13)
RBC # BLD: 3.67 M/UL — LOW (ref 3.8–5.2)
RBC # FLD: 13.2 % — SIGNIFICANT CHANGE UP (ref 10.3–14.5)
T4 FREE SERPL-MCNC: 1.04 NG/DL — SIGNIFICANT CHANGE UP (ref 0.9–1.8)
TSH SERPL-MCNC: 2.41 UIU/ML — SIGNIFICANT CHANGE UP (ref 0.27–4.2)
WBC # BLD: 10.17 K/UL — SIGNIFICANT CHANGE UP (ref 3.8–10.5)
WBC # FLD AUTO: 10.17 K/UL — SIGNIFICANT CHANGE UP (ref 3.8–10.5)

## 2020-11-05 DIAGNOSIS — Z23 ENCOUNTER FOR IMMUNIZATION: ICD-10-CM

## 2020-11-05 DIAGNOSIS — Z34.03 ENCOUNTER FOR SUPERVISION OF NORMAL FIRST PREGNANCY, THIRD TRIMESTER: ICD-10-CM

## 2020-11-05 DIAGNOSIS — O26.813 PREGNANCY RELATED EXHAUSTION AND FATIGUE, THIRD TRIMESTER: ICD-10-CM

## 2020-11-17 ENCOUNTER — OUTPATIENT (OUTPATIENT)
Dept: OUTPATIENT SERVICES | Facility: HOSPITAL | Age: 30
LOS: 1 days | End: 2020-11-17

## 2020-11-17 ENCOUNTER — APPOINTMENT (OUTPATIENT)
Dept: OBGYN | Facility: HOSPITAL | Age: 30
End: 2020-11-17

## 2020-11-17 ENCOUNTER — NON-APPOINTMENT (OUTPATIENT)
Age: 30
End: 2020-11-17

## 2020-11-17 VITALS
SYSTOLIC BLOOD PRESSURE: 98 MMHG | HEART RATE: 89 BPM | HEIGHT: 63 IN | DIASTOLIC BLOOD PRESSURE: 57 MMHG | BODY MASS INDEX: 26.7 KG/M2 | TEMPERATURE: 98 F | WEIGHT: 150.7 LBS

## 2020-11-17 RX ORDER — SIMETHICONE 80 MG/1
80 TABLET, CHEWABLE ORAL 4 TIMES DAILY
Qty: 1 | Refills: 2 | Status: COMPLETED | COMMUNITY
Start: 2020-11-17 | End: 2021-01-01

## 2020-11-19 DIAGNOSIS — R14.1 GAS PAIN: ICD-10-CM

## 2020-11-19 DIAGNOSIS — Z34.03 ENCOUNTER FOR SUPERVISION OF NORMAL FIRST PREGNANCY, THIRD TRIMESTER: ICD-10-CM

## 2020-12-01 ENCOUNTER — NON-APPOINTMENT (OUTPATIENT)
Age: 30
End: 2020-12-01

## 2020-12-01 ENCOUNTER — RESULT REVIEW (OUTPATIENT)
Age: 30
End: 2020-12-01

## 2020-12-01 ENCOUNTER — APPOINTMENT (OUTPATIENT)
Dept: OBGYN | Facility: HOSPITAL | Age: 30
End: 2020-12-01

## 2020-12-01 ENCOUNTER — LABORATORY RESULT (OUTPATIENT)
Age: 30
End: 2020-12-01

## 2020-12-01 ENCOUNTER — OUTPATIENT (OUTPATIENT)
Dept: OUTPATIENT SERVICES | Facility: HOSPITAL | Age: 30
LOS: 1 days | End: 2020-12-01

## 2020-12-01 VITALS
WEIGHT: 151 LBS | TEMPERATURE: 98.1 F | HEART RATE: 100 BPM | DIASTOLIC BLOOD PRESSURE: 59 MMHG | BODY MASS INDEX: 26.75 KG/M2 | SYSTOLIC BLOOD PRESSURE: 101 MMHG

## 2020-12-01 DIAGNOSIS — R79.89 OTHER SPECIFIED ABNORMAL FINDINGS OF BLOOD CHEMISTRY: ICD-10-CM

## 2020-12-01 DIAGNOSIS — K64.9 UNSPECIFIED HEMORRHOIDS: ICD-10-CM

## 2020-12-01 DIAGNOSIS — Z34.03 ENCOUNTER FOR SUPERVISION OF NORMAL FIRST PREGNANCY, THIRD TRIMESTER: ICD-10-CM

## 2020-12-01 DIAGNOSIS — O99.613 DISEASES OF THE DIGESTIVE SYSTEM COMPLICATING PREGNANCY, THIRD TRIMESTER: ICD-10-CM

## 2020-12-02 LAB
C TRACH RRNA SPEC QL NAA+PROBE: SIGNIFICANT CHANGE UP
N GONORRHOEA RRNA SPEC QL NAA+PROBE: SIGNIFICANT CHANGE UP
SPECIMEN SOURCE: SIGNIFICANT CHANGE UP

## 2020-12-03 LAB
CULTURE RESULTS: SIGNIFICANT CHANGE UP
SPECIMEN SOURCE: SIGNIFICANT CHANGE UP

## 2020-12-08 ENCOUNTER — OUTPATIENT (OUTPATIENT)
Dept: OUTPATIENT SERVICES | Facility: HOSPITAL | Age: 30
LOS: 1 days | End: 2020-12-08

## 2020-12-08 ENCOUNTER — NON-APPOINTMENT (OUTPATIENT)
Age: 30
End: 2020-12-08

## 2020-12-08 ENCOUNTER — APPOINTMENT (OUTPATIENT)
Dept: OBGYN | Facility: HOSPITAL | Age: 30
End: 2020-12-08

## 2020-12-08 VITALS
WEIGHT: 153 LBS | BODY MASS INDEX: 27.1 KG/M2 | HEART RATE: 84 BPM | DIASTOLIC BLOOD PRESSURE: 51 MMHG | SYSTOLIC BLOOD PRESSURE: 104 MMHG | TEMPERATURE: 98 F

## 2020-12-09 DIAGNOSIS — Z34.03 ENCOUNTER FOR SUPERVISION OF NORMAL FIRST PREGNANCY, THIRD TRIMESTER: ICD-10-CM

## 2020-12-14 ENCOUNTER — OUTPATIENT (OUTPATIENT)
Dept: INPATIENT UNIT | Facility: HOSPITAL | Age: 30
LOS: 1 days | Discharge: HOME | End: 2020-12-14
Payer: MEDICAID

## 2020-12-14 VITALS — HEART RATE: 72 BPM | DIASTOLIC BLOOD PRESSURE: 67 MMHG | SYSTOLIC BLOOD PRESSURE: 114 MMHG

## 2020-12-14 VITALS — HEART RATE: 80 BPM | SYSTOLIC BLOOD PRESSURE: 108 MMHG | DIASTOLIC BLOOD PRESSURE: 73 MMHG

## 2020-12-14 DIAGNOSIS — O26.899 OTHER SPECIFIED PREGNANCY RELATED CONDITIONS, UNSPECIFIED TRIMESTER: ICD-10-CM

## 2020-12-14 DIAGNOSIS — Z3A.00 WEEKS OF GESTATION OF PREGNANCY NOT SPECIFIED: ICD-10-CM

## 2020-12-14 PROCEDURE — 76818 FETAL BIOPHYS PROFILE W/NST: CPT | Mod: 26

## 2020-12-14 PROCEDURE — 99214 OFFICE O/P EST MOD 30 MIN: CPT

## 2020-12-14 NOTE — OB PROVIDER TRIAGE NOTE - ADDITIONAL INSTRUCTIONS
-Cleared for discharge   -keep scheduled appt (tuesday)  -fetal kick count reviewed   -warning signs reviewed

## 2020-12-14 NOTE — OB PROVIDER TRIAGE NOTE - NSHPPHYSICALEXAM_GEN_ALL_CORE
Vital Signs Last 24 Hrs  T(C): 36.5 (14 Dec 2020 05:00), Max: 36.5 (14 Dec 2020 05:00)  T(F): 97.7 (14 Dec 2020 05:00), Max: 97.7 (14 Dec 2020 05:00)  HR: 80 (14 Dec 2020 05:42) (72 - 80)  BP: 108/73 (14 Dec 2020 05:42) (108/73 - 114/67)  RR: 17 (14 Dec 2020 05:00) (17 - 17)    Abdomen soft, nontender     SSE cervix appears closed, no fluid visualized, negative pool/ nitrazine/fern  White chunky cream/discharge noted     SVE closed/50/-3    TAUS cephalic presentation, posterior placenta, felix 23.34 cm, bpp 10/10    Category 1 tracing, , moderate variability, + accels, no decels  contractions q 2-4 min by toco

## 2020-12-14 NOTE — OB PROVIDER TRIAGE NOTE - HISTORY OF PRESENT ILLNESS
31 yo  37 6/7 week with complaints of leaking of fluid since 10 am 20. Pt reports good fetal movement and irregular contractions. Denies vaginal bleeding. Pt reports using metronidazole cream for a yeast infection last week.     Current a/p complications: Denies     Allergies: NKDA  Medications: PNV, Vit D   PMH: Denies   PSH: Denies   OB/GYN: Denies   Social: Denies   Psychosocial: Denies

## 2020-12-14 NOTE — OB PROVIDER TRIAGE NOTE - NS_GBS_INFANT_INVASIVE_OBGYN_ALL_OB_FT
Previous message closed.    Patient is returning Cherry's call. Patient stated she returned call last week and spoke to Pamela. Patient stated to call her back of course if this is related to a new episode. Thank you.     [phone]    N/A

## 2020-12-14 NOTE — OB PROVIDER TRIAGE NOTE - NSOBPROVIDERNOTE_OBGYN_ALL_OB_FT
No evidence of rom     d/w Dr. Diego/ Dr. Solomon     -Cleared for discharge   -keep scheduled appt (tuesday)  -fetal kick count reviewed   -warning signs reviewed

## 2020-12-15 ENCOUNTER — APPOINTMENT (OUTPATIENT)
Dept: OBGYN | Facility: HOSPITAL | Age: 30
End: 2020-12-15

## 2020-12-15 ENCOUNTER — OUTPATIENT (OUTPATIENT)
Dept: OUTPATIENT SERVICES | Facility: HOSPITAL | Age: 30
LOS: 1 days | End: 2020-12-15

## 2020-12-15 ENCOUNTER — NON-APPOINTMENT (OUTPATIENT)
Age: 30
End: 2020-12-15

## 2020-12-15 VITALS
WEIGHT: 152 LBS | DIASTOLIC BLOOD PRESSURE: 61 MMHG | HEIGHT: 63 IN | TEMPERATURE: 98 F | BODY MASS INDEX: 26.93 KG/M2 | HEART RATE: 87 BPM | SYSTOLIC BLOOD PRESSURE: 101 MMHG

## 2020-12-21 DIAGNOSIS — Z34.03 ENCOUNTER FOR SUPERVISION OF NORMAL FIRST PREGNANCY, THIRD TRIMESTER: ICD-10-CM

## 2020-12-22 ENCOUNTER — OUTPATIENT (OUTPATIENT)
Dept: OUTPATIENT SERVICES | Facility: HOSPITAL | Age: 30
LOS: 1 days | End: 2020-12-22

## 2020-12-22 ENCOUNTER — APPOINTMENT (OUTPATIENT)
Dept: OBGYN | Facility: HOSPITAL | Age: 30
End: 2020-12-22

## 2020-12-22 ENCOUNTER — NON-APPOINTMENT (OUTPATIENT)
Age: 30
End: 2020-12-22

## 2020-12-22 VITALS
DIASTOLIC BLOOD PRESSURE: 52 MMHG | SYSTOLIC BLOOD PRESSURE: 104 MMHG | HEART RATE: 78 BPM | BODY MASS INDEX: 27.81 KG/M2 | TEMPERATURE: 98 F | WEIGHT: 157 LBS

## 2020-12-23 PROBLEM — Z87.440 HISTORY OF URINARY TRACT INFECTION: Status: RESOLVED | Noted: 2020-06-05 | Resolved: 2020-12-23

## 2020-12-28 DIAGNOSIS — Z34.93 ENCOUNTER FOR SUPERVISION OF NORMAL PREGNANCY, UNSPECIFIED, THIRD TRIMESTER: ICD-10-CM

## 2020-12-29 ENCOUNTER — APPOINTMENT (OUTPATIENT)
Dept: OBGYN | Facility: HOSPITAL | Age: 30
End: 2020-12-29

## 2020-12-29 ENCOUNTER — OUTPATIENT (OUTPATIENT)
Dept: INPATIENT UNIT | Facility: HOSPITAL | Age: 30
LOS: 1 days | Discharge: HOME | End: 2020-12-29
Payer: MEDICAID

## 2020-12-29 ENCOUNTER — OUTPATIENT (OUTPATIENT)
Dept: INPATIENT UNIT | Facility: HOSPITAL | Age: 30
LOS: 1 days | Discharge: HOME | End: 2020-12-29

## 2020-12-29 ENCOUNTER — NON-APPOINTMENT (OUTPATIENT)
Age: 30
End: 2020-12-29

## 2020-12-29 ENCOUNTER — OUTPATIENT (OUTPATIENT)
Dept: OUTPATIENT SERVICES | Facility: HOSPITAL | Age: 30
LOS: 1 days | End: 2020-12-29

## 2020-12-29 VITALS
TEMPERATURE: 99 F | RESPIRATION RATE: 16 BRPM | DIASTOLIC BLOOD PRESSURE: 70 MMHG | HEART RATE: 83 BPM | SYSTOLIC BLOOD PRESSURE: 107 MMHG

## 2020-12-29 VITALS
DIASTOLIC BLOOD PRESSURE: 59 MMHG | SYSTOLIC BLOOD PRESSURE: 95 MMHG | TEMPERATURE: 98.1 F | HEIGHT: 63 IN | HEART RATE: 73 BPM | BODY MASS INDEX: 27.46 KG/M2 | WEIGHT: 155 LBS

## 2020-12-29 VITALS — SYSTOLIC BLOOD PRESSURE: 107 MMHG | DIASTOLIC BLOOD PRESSURE: 70 MMHG | HEART RATE: 83 BPM

## 2020-12-29 DIAGNOSIS — O26.899 OTHER SPECIFIED PREGNANCY RELATED CONDITIONS, UNSPECIFIED TRIMESTER: ICD-10-CM

## 2020-12-29 DIAGNOSIS — Z3A.00 WEEKS OF GESTATION OF PREGNANCY NOT SPECIFIED: ICD-10-CM

## 2020-12-29 PROCEDURE — 76818 FETAL BIOPHYS PROFILE W/NST: CPT | Mod: 26

## 2020-12-29 PROCEDURE — 99213 OFFICE O/P EST LOW 20 MIN: CPT

## 2020-12-29 NOTE — OB PROVIDER TRIAGE NOTE - NSOBPROVIDERNOTE_OBGYN_ALL_OB_FT
This is a 30 year old  at 40 weeks gestational age in early labor    plan of care discussed with dr christianson  maternal/fetal surveillance reassuring  labor precuations reviewed with patient  continue fetal kick counts, rest and activity as tolerated, increase PO fluid  follow up with pcap as scheduled, pt sent to [facility] clinic after discharge to arrange follow up  spoke to lico at [facility] clinic to know patient is coming down for follow up scheduling  pt verbalized understanding of instructions given  discharged home

## 2020-12-29 NOTE — OB PROVIDER TRIAGE NOTE - ADDITIONAL INSTRUCTIONS
plan of care discussed with dr christianson  maternal/fetal surveillance reassuring  labor precuations reviewed with patient  continue fetal kick counts, rest and activity as tolerated, increase PO fluid  follow up with pcap as scheduled, pt sent to [facility] clinic after discharge to arrange follow up  pt verbalized understanding of instructions given  discharged home

## 2020-12-29 NOTE — OB RN TRIAGE NOTE - NS_FETALMOVEMENT_OBGYN_ALL_OB
Bed: ED02  Expected date:   Expected time:   Means of arrival:   Comments:  MRN: [id number]    75 yo male  Went in for preop procedure, fell from standing  Denies LOC or head injury.   C/o abdominal pain, CT shows splenic laceration.  Hypotensive on arrival, 1 1/2 liter fluids given, last /56. Hanging 1 unit RBC prior to transfer.   2 18G PIV  A/o x4  Hgb on arrival 14.3, last recheck 11.9.  Dilaudid and fentanyl given, pain controlled.   Present, unchanged

## 2020-12-29 NOTE — OB PROVIDER TRIAGE NOTE - HISTORY OF PRESENT ILLNESS
This is a 30 year old PCAP patient  at 40 weeks gestational age presents with complaints of contractions q 10 minutes, denies LOF, VB or need for pain management. denies fever, cough, shortness of breath, or recent covid exposure.  ap course uncomplicated as per patient  GBS neg     med: denies  surg: denies  gyn: denies  ob: denies  current  meds: pnv  NKDA

## 2020-12-29 NOTE — OB PROVIDER TRIAGE NOTE - NSHPPHYSICALEXAM_GEN_ALL_CORE
Vital Signs Last 24 Hrs  T(C): 37.0 (29 Dec 2020 12:52), Max: 37 (29 Dec 2020 12:29)  T(F): 98.6 (29 Dec 2020 12:52), Max: 98.6 (29 Dec 2020 12:29)  HR: 83 (29 Dec 2020 12:54) (83 - 83)  BP: 107/70 (29 Dec 2020 12:54) (107/70 - 107/70)  BP(mean): --  RR: 16 (29 Dec 2020 12:29) (16 - 16)  SpO2: --    A&O x3  CTAB  abdomen: gravid, soft, nontender, mild uterine contractions  sve 0.5/20/-3  NST in progress Vital Signs Last 24 Hrs  T(C): 37.0 (29 Dec 2020 12:52), Max: 37 (29 Dec 2020 12:29)  T(F): 98.6 (29 Dec 2020 12:52), Max: 98.6 (29 Dec 2020 12:29)  HR: 83 (29 Dec 2020 12:54) (83 - 83)  BP: 107/70 (29 Dec 2020 12:54) (107/70 - 107/70)  BP(mean): --  RR: 16 (29 Dec 2020 12:29) (16 - 16)  SpO2: --    A&O x3  CTAB  abdomen: gravid, soft, nontender, mild uterine contractions  sve 0.5/20/-3  NST in progress  155 bpm mod variability, psoitive accels, no decels  TAS: vtx, posterior placenta, bpp 8/8, felix 19.33

## 2021-01-04 ENCOUNTER — APPOINTMENT (OUTPATIENT)
Dept: OBGYN | Facility: HOSPITAL | Age: 31
End: 2021-01-04

## 2021-01-04 ENCOUNTER — APPOINTMENT (OUTPATIENT)
Dept: ANTEPARTUM | Facility: CLINIC | Age: 31
End: 2021-01-04
Payer: MEDICAID

## 2021-01-04 ENCOUNTER — ASOB RESULT (OUTPATIENT)
Age: 31
End: 2021-01-04

## 2021-01-04 ENCOUNTER — APPOINTMENT (OUTPATIENT)
Dept: ANTEPARTUM | Facility: HOSPITAL | Age: 31
End: 2021-01-04

## 2021-01-04 ENCOUNTER — OUTPATIENT (OUTPATIENT)
Dept: OUTPATIENT SERVICES | Facility: HOSPITAL | Age: 31
LOS: 1 days | End: 2021-01-04

## 2021-01-04 ENCOUNTER — NON-APPOINTMENT (OUTPATIENT)
Age: 31
End: 2021-01-04

## 2021-01-04 VITALS — WEIGHT: 155 LBS | BODY MASS INDEX: 27.46 KG/M2 | DIASTOLIC BLOOD PRESSURE: 65 MMHG | SYSTOLIC BLOOD PRESSURE: 104 MMHG

## 2021-01-04 VITALS — TEMPERATURE: 98 F

## 2021-01-04 DIAGNOSIS — Z34.03 ENCOUNTER FOR SUPERVISION OF NORMAL FIRST PREGNANCY, THIRD TRIMESTER: ICD-10-CM

## 2021-01-04 PROCEDURE — 76816 OB US FOLLOW-UP PER FETUS: CPT | Mod: 26

## 2021-01-04 PROCEDURE — 76818 FETAL BIOPHYS PROFILE W/NST: CPT | Mod: 26

## 2021-01-06 ENCOUNTER — INPATIENT (INPATIENT)
Facility: HOSPITAL | Age: 31
LOS: 3 days | Discharge: HOME | End: 2021-01-10
Attending: SPECIALIST | Admitting: SPECIALIST
Payer: MEDICAID

## 2021-01-06 VITALS
HEART RATE: 73 BPM | DIASTOLIC BLOOD PRESSURE: 69 MMHG | RESPIRATION RATE: 18 BRPM | SYSTOLIC BLOOD PRESSURE: 116 MMHG | OXYGEN SATURATION: 98 % | TEMPERATURE: 98 F

## 2021-01-06 DIAGNOSIS — O48.0 POST-TERM PREGNANCY: ICD-10-CM

## 2021-01-06 LAB
BASOPHILS # BLD AUTO: 0.03 K/UL — SIGNIFICANT CHANGE UP (ref 0–0.2)
BASOPHILS NFR BLD AUTO: 0.3 % — SIGNIFICANT CHANGE UP (ref 0–2)
BLD GP AB SCN SERPL QL: NEGATIVE — SIGNIFICANT CHANGE UP
EOSINOPHIL # BLD AUTO: 0.07 K/UL — SIGNIFICANT CHANGE UP (ref 0–0.5)
EOSINOPHIL NFR BLD AUTO: 0.8 % — SIGNIFICANT CHANGE UP (ref 0–6)
HCT VFR BLD CALC: 35.9 % — SIGNIFICANT CHANGE UP (ref 34.5–45)
HGB BLD-MCNC: 11.8 G/DL — SIGNIFICANT CHANGE UP (ref 11.5–15.5)
IANC: 5.72 K/UL — SIGNIFICANT CHANGE UP (ref 1.5–8.5)
IMM GRANULOCYTES NFR BLD AUTO: 1.4 % — SIGNIFICANT CHANGE UP (ref 0–1.5)
LYMPHOCYTES # BLD AUTO: 2.36 K/UL — SIGNIFICANT CHANGE UP (ref 1–3.3)
LYMPHOCYTES # BLD AUTO: 26 % — SIGNIFICANT CHANGE UP (ref 13–44)
MCHC RBC-ENTMCNC: 28.7 PG — SIGNIFICANT CHANGE UP (ref 27–34)
MCHC RBC-ENTMCNC: 32.9 GM/DL — SIGNIFICANT CHANGE UP (ref 32–36)
MCV RBC AUTO: 87.3 FL — SIGNIFICANT CHANGE UP (ref 80–100)
MONOCYTES # BLD AUTO: 0.75 K/UL — SIGNIFICANT CHANGE UP (ref 0–0.9)
MONOCYTES NFR BLD AUTO: 8.3 % — SIGNIFICANT CHANGE UP (ref 2–14)
NEUTROPHILS # BLD AUTO: 5.72 K/UL — SIGNIFICANT CHANGE UP (ref 1.8–7.4)
NEUTROPHILS NFR BLD AUTO: 63.2 % — SIGNIFICANT CHANGE UP (ref 43–77)
NRBC # BLD: 0 /100 WBCS — SIGNIFICANT CHANGE UP
NRBC # FLD: 0 K/UL — SIGNIFICANT CHANGE UP
PLATELET # BLD AUTO: 231 K/UL — SIGNIFICANT CHANGE UP (ref 150–400)
RBC # BLD: 4.11 M/UL — SIGNIFICANT CHANGE UP (ref 3.8–5.2)
RBC # FLD: 14.7 % — HIGH (ref 10.3–14.5)
RH IG SCN BLD-IMP: POSITIVE — SIGNIFICANT CHANGE UP
WBC # BLD: 9.06 K/UL — SIGNIFICANT CHANGE UP (ref 3.8–10.5)
WBC # FLD AUTO: 9.06 K/UL — SIGNIFICANT CHANGE UP (ref 3.8–10.5)

## 2021-01-06 RX ORDER — SODIUM CHLORIDE 9 MG/ML
1000 INJECTION, SOLUTION INTRAVENOUS
Refills: 0 | Status: DISCONTINUED | OUTPATIENT
Start: 2021-01-06 | End: 2021-01-08

## 2021-01-06 RX ORDER — CITRIC ACID/SODIUM CITRATE 300-500 MG
15 SOLUTION, ORAL ORAL EVERY 6 HOURS
Refills: 0 | Status: DISCONTINUED | OUTPATIENT
Start: 2021-01-06 | End: 2021-01-08

## 2021-01-06 RX ORDER — OXYTOCIN 10 UNIT/ML
333.33 VIAL (ML) INJECTION
Qty: 20 | Refills: 0 | Status: DISCONTINUED | OUTPATIENT
Start: 2021-01-06 | End: 2021-01-09

## 2021-01-06 RX ADMIN — SODIUM CHLORIDE 125 MILLILITER(S): 9 INJECTION, SOLUTION INTRAVENOUS at 22:00

## 2021-01-06 NOTE — OB PROVIDER H&P - NSINFECTDETAILS_OBGYN_ALL_OB
Critical Care Consultation    Date of consult: 1/18/2019    Referring Physician  Coby Cheney M.D.    Reason for Consultation  Acute hypoxic and hypercapnic respiratory failure    History of Presenting Illness  70 y.o. female with past medical history of diastolic heart failure, obesity, moderate aortic stenosis, COPD, pulmonary hypertension, who presented 1/18/2019 with progressive worsening dyspnea for approximately 1 week.  The patient and her daughter who is at bedside provide all history, they state that approximately 2 weeks ago she was on a cruise to [place] and noted a small amount of chest pain which resolved.  When she returned from [place] she noted some nasal stuffiness as well as chest congestion which worsened throughout the week and was accompanied by lower extremity swelling.  She is on 2 L 24-hour oxygen at home which was increased to 3-4 L this week.  She has noted a cough productive of tan sputum however she has been afebrile and without chills, daughter has noted some increased confusion in her mother which has resolved in the ER.  She presented to the emergency department for the above complaints and was found to be in partially compensated respiratory acidosis with a PCO2 of 104.5 and a pH of 7.26.  A chest x-ray demonstrated pulmonary edema with effusions and cardiomegaly.  Her labs show an absence of leukocytosis, chemistry panel shows normal renal function with an elevated CO2 at 44 and glucose at 133.  Her troponin was elevated at 0.31 BNP elevated at 372. EKG was non-ischemic.   She was placed on noninvasive positive pressure ventilation in the emergency department and given Lasix for her pulmonary edema with hypoxia.  Following these interventions the patient states she feels much better and her breathing is much more relaxed.  She is to be admitted to the ICU for acute on chronic hypoxic respiratory failure with pulmonary edema requiring rescue BiPAP.    Code Status  No  Order    Review of Systems  Review of Systems   Constitutional: Negative for chills, fatigue, fever and unexpected weight change.   HENT: Negative for sore throat, trouble swallowing and voice change.    Eyes: Negative for visual disturbance.   Respiratory: Positive for cough and shortness of breath. Negative for wheezing.    Cardiovascular: Positive for leg swelling. Negative for chest pain (resolved) and palpitations.   Gastrointestinal: Negative for abdominal pain, constipation, diarrhea, nausea and vomiting.   Genitourinary: Negative for decreased urine volume and difficulty urinating.   Musculoskeletal: Negative for arthralgias, myalgias and neck pain.   Skin: Positive for color change (LE). Negative for rash.   Neurological: Negative for seizures, speech difficulty, weakness, numbness and headaches.   Psychiatric/Behavioral: Positive for confusion.       Past Medical History   has a past medical history of Aortic stenosis; Arrhythmia; Arthritis; CHF (congestive heart failure) (HCC); Chickenpox; COPD; Daytime sleepiness; Difficulty breathing; Amharic measles; Heartburn; MEDICAL HOME (11/9/2012); Mumps; Painful joint; Palpitations; Rash; Shortness of breath; Swelling of lower extremity; Toothache; and Weight loss. She also has no past medical history of Depression.    Surgical History   has a past surgical history that includes tonsillectomy; appendectomy; primary c section; tubal coagulation laparoscopic bilateral; and gastric bypass laparoscopic.    Family History  family history includes Alcohol abuse in her mother; Asthma in her sister; Breast Cancer in her sister; Cancer in her maternal aunt; Dementia in her father; Hyperlipidemia in her sister; Hypertension in her daughter; Lung Disease in her sister; No Known Problems in her maternal grandfather, maternal grandmother, and paternal grandmother; Other in her daughter; Prostate cancer in her father; Thyroid in her daughter.    Social History   reports that  she quit smoking about 35 years ago. Her smoking use included Cigarettes. She has a 13.00 pack-year smoking history. She has never used smokeless tobacco. She reports that she drinks about 4.2 oz of alcohol per week . She reports that she does not use drugs.    Medications  Home Medications     Reviewed by Elayne Whittaker (Pharmacy Tech) on 01/18/19 at 1338  Med List Status: Complete   Medication Last Dose Status   albuterol (PROVENTIL) 2.5mg/3ml Nebu Soln solution for nebulization 1/18/2019 Active   albuterol 108 (90 Base) MCG/ACT Aero Soln inhalation aerosol 1/18/2019 Active   aspirin 81 MG tablet 1/18/2019 Active   busPIRone (BUSPAR) 7.5 MG tablet 1/17/2019 Active   CALCIUM PO 1/18/2019 Active   clobetasol (TEMOVATE) 0.05 % external solution 1/16/2019 Active   clotrimazole-betamethasone (LOTRISONE) 1-0.05 % Cream 1/16/2019 Active   Fluticasone-Umeclidin-Vilant (TRELEGY ELLIPTA) 100-62.5-25 MCG/INH AEROSOL POWDER, BREATH ACTIVATED 1/18/2019 Active   furosemide (LASIX) 20 MG Tab 1/18/2019 Active   ibuprofen (ADVIL) 200 MG Tab 1/18/2019 Active   Lactobacillus Rhamnosus, GG, (CULTURELLE PO) 1/18/2019 Active   Multiple Vitamin (MULTI-VITAMIN PO) 1/18/2019 Active   Omega-3 Fatty Acids (FISH OIL) 1000 MG Cap capsule 1/18/2019 Active   omeprazole (PRILOSEC) 20 MG delayed-release capsule 1/15/2019 Active   potassium chloride SA (KDUR) 20 MEQ Tab CR 1/18/2019 Active   triamcinolone acetonide (KENALOG) 0.025 % Cream 1/16/2019 Active   vitamin D (CHOLECALCIFEROL) 1000 UNIT TABS 1/18/2019 Active              Current Facility-Administered Medications   Medication Dose Route Frequency Provider Last Rate Last Dose   • aspirin (ASA) chewable tab 162 mg  162 mg Oral Once Coby Cheney M.D.       • diphenhydrAMINE (BENADRYL) injection 25 mg  25 mg Intravenous Once Coby Cheney M.D.         Current Outpatient Prescriptions   Medication Sig Dispense Refill   • busPIRone (BUSPAR) 7.5 MG tablet Take 7.5 mg by mouth at  bedtime as needed.     • furosemide (LASIX) 20 MG Tab Take 60 mg by mouth every day.     • potassium chloride SA (KDUR) 20 MEQ Tab CR Take 20 mEq by mouth every morning.     • Fluticasone-Umeclidin-Vilant (TRELEGY ELLIPTA) 100-62.5-25 MCG/INH AEROSOL POWDER, BREATH ACTIVATED Inhale 1 Puff by mouth every day. 2 Each 0   • clobetasol (TEMOVATE) 0.05 % external solution Apply sparingly bid 50 mL 0   • clotrimazole-betamethasone (LOTRISONE) 1-0.05 % Cream Apply 1 Application to affected area(s) 2 times a day. 1 Tube 1   • albuterol 108 (90 Base) MCG/ACT Aero Soln inhalation aerosol Inhale 2 Puffs by mouth every 6 hours as needed for Shortness of Breath. 8.5 g 3   • CALCIUM PO Take 1 Tab by mouth every day.     • Omega-3 Fatty Acids (FISH OIL) 1000 MG Cap capsule Take 1,000 mg by mouth every day.     • albuterol (PROVENTIL) 2.5mg/3ml Nebu Soln solution for nebulization USE 3 ML BY NEBULIZATION ROUTE EVERY 6 HOURS AS NEEDED FOR SHORTNESS OF BREATH. 375 mL 0   • ibuprofen (ADVIL) 200 MG Tab Take 400 mg by mouth every morning.     • triamcinolone acetonide (KENALOG) 0.025 % Cream APPLY TO AFFECTED AREA(S) 2 TIMES A DAY. 15 g 1   • omeprazole (PRILOSEC) 20 MG delayed-release capsule Take 1 Cap by mouth every day. 90 Cap 1   • Lactobacillus Rhamnosus, GG, (CULTURELLE PO) Take 1 Cap by mouth every morning.     • vitamin D (CHOLECALCIFEROL) 1000 UNIT TABS Take 5,000 Units by mouth every day.     • Multiple Vitamin (MULTI-VITAMIN PO) Take 1 Tab by mouth every morning.     • aspirin 81 MG tablet Take 81 mg by mouth every day.         Allergies  Allergies   Allergen Reactions   • Bee Anaphylaxis   • Fosamax Unspecified     Bone pain   • Iodine Rash     RASH   • Pcn [Penicillins] Rash     Rash years ago   • Sulfa Drugs Rash     Terrible rash   • Other Drug Unspecified     Also allergies to Actenol   • Tape Unspecified     Pulls my skin       Vital Signs last 24 hours  Temp:  [37.2 °C (98.9 °F)] 37.2 °C (98.9 °F)  Pulse:  [76-91]  82  Resp:  [16-25] 24  BP: (109-125)/(46-52) 109/52  SpO2:  [93 %-99 %] 96 %    Physical Exam  Physical Exam   Constitutional: She is oriented to person, place, and time. She appears well-developed and well-nourished. She appears distressed. Face mask in place.   HENT:   Head: Normocephalic and atraumatic.   Right Ear: External ear normal.   Left Ear: External ear normal.   Nose: Nose normal.   Mouth/Throat: Oropharynx is clear and moist.   Eyes: Conjunctivae and EOM are normal.   Neck: Neck supple. JVD present. No tracheal deviation present.   Cardiovascular: Normal rate, regular rhythm and intact distal pulses.    Murmur heard.   Systolic murmur is present with a grade of 5/6   Pulmonary/Chest: Tachypnea noted. She is in respiratory distress. She has decreased breath sounds. She has wheezes. She has rales.   Abdominal: Soft. Bowel sounds are normal. She exhibits no distension. There is no tenderness.   Musculoskeletal: She exhibits edema (2-3+).   Neurological: She is alert and oriented to person, place, and time. She exhibits normal muscle tone. Coordination normal.   Skin:   LE venous stasis dermatitis   Psychiatric: She has a normal mood and affect. Her behavior is normal.   Nursing note and vitals reviewed.      Fluids  No intake or output data in the 24 hours ending 01/18/19 1549    Laboratory  Recent Results (from the past 48 hour(s))   CBC w/ Differential    Collection Time: 01/18/19 11:12 AM   Result Value Ref Range    WBC 5.5 4.8 - 10.8 K/uL    RBC 4.07 (L) 4.20 - 5.40 M/uL    Hemoglobin 11.7 (L) 12.0 - 16.0 g/dL    Hematocrit 38.9 37.0 - 47.0 %    MCV 95.6 81.4 - 97.8 fL    MCH 28.7 27.0 - 33.0 pg    MCHC 30.1 (L) 33.6 - 35.0 g/dL    RDW 47.0 35.9 - 50.0 fL    Platelet Count 200 164 - 446 K/uL    MPV 9.6 9.0 - 12.9 fL    Neutrophils-Polys 73.50 (H) 44.00 - 72.00 %    Lymphocytes 16.50 (L) 22.00 - 41.00 %    Monocytes 8.90 0.00 - 13.40 %    Eosinophils 0.50 0.00 - 6.90 %    Basophils 0.20 0.00 - 1.80 %     Immature Granulocytes 0.40 0.00 - 0.90 %    Nucleated RBC 0.00 /100 WBC    Neutrophils (Absolute) 4.06 2.00 - 7.15 K/uL    Lymphs (Absolute) 0.91 (L) 1.00 - 4.80 K/uL    Monos (Absolute) 0.49 0.00 - 0.85 K/uL    Eos (Absolute) 0.03 0.00 - 0.51 K/uL    Baso (Absolute) 0.01 0.00 - 0.12 K/uL    Immature Granulocytes (abs) 0.02 0.00 - 0.11 K/uL    NRBC (Absolute) 0.00 K/uL   Complete Metabolic Panel (CMP)    Collection Time: 01/18/19 11:12 AM   Result Value Ref Range    Sodium 141 135 - 145 mmol/L    Potassium 5.1 3.6 - 5.5 mmol/L    Chloride 90 (L) 96 - 112 mmol/L    Co2 44 (HH) 20 - 33 mmol/L    Anion Gap 7.0 0.0 - 11.9    Glucose 133 (H) 65 - 99 mg/dL    Bun 14 8 - 22 mg/dL    Creatinine 0.47 (L) 0.50 - 1.40 mg/dL    Calcium 9.5 8.5 - 10.5 mg/dL    AST(SGOT) 18 12 - 45 U/L    ALT(SGPT) 5 2 - 50 U/L    Alkaline Phosphatase 54 30 - 99 U/L    Total Bilirubin 0.4 0.1 - 1.5 mg/dL    Albumin 3.6 3.2 - 4.9 g/dL    Total Protein 6.7 6.0 - 8.2 g/dL    Globulin 3.1 1.9 - 3.5 g/dL    A-G Ratio 1.2 g/dL   Btype Natriuretic Peptide    Collection Time: 01/18/19 11:12 AM   Result Value Ref Range    B Natriuretic Peptide 372 (H) 0 - 100 pg/mL   Troponin STAT    Collection Time: 01/18/19 11:12 AM   Result Value Ref Range    Troponin I 0.31 (H) 0.00 - 0.04 ng/mL   Magnesium    Collection Time: 01/18/19 11:12 AM   Result Value Ref Range    Magnesium 1.7 1.5 - 2.5 mg/dL   Phosphorus    Collection Time: 01/18/19 11:12 AM   Result Value Ref Range    Phosphorus 3.2 2.5 - 4.5 mg/dL   Influenza A/B By PCR    Collection Time: 01/18/19 11:12 AM   Result Value Ref Range    Influenza virus A RNA Negative Negative    Influenza virus B, PCR Negative Negative   ESTIMATED GFR    Collection Time: 01/18/19 11:12 AM   Result Value Ref Range    GFR If African American >60 >60 mL/min/1.73 m 2    GFR If Non African American >60 >60 mL/min/1.73 m 2   LACTIC ACID    Collection Time: 01/18/19 12:35 PM   Result Value Ref Range    Lactic Acid 1.5 0.5 - 2.0  mmol/L   EKG - STAT    Collection Time: 19 12:37 PM   Result Value Ref Range    Report       [facility]    Test Date:  2019  Pt Name:    IGOR CISNEROS                 Department: ER  MRN:        [id number]                      Room:       [facility]  Gender:     Female                       Technician: [id number]  :        [id number]                   Requested By:NIC QUIGLEY  Order #:    [id number]                    Reading MD: NIC QUIGLEY MD    Measurements  Intervals                                Axis  Rate:       90                           P:          38  NE:         156                          QRS:        47  QRSD:       92                           T:          32  QT:         380  QTc:        465    Interpretive Statements  SINUS RHYTHM  PROBABLE LEFT ATRIAL ABNORMALITY  BORDERLINE LOW VOLTAGE IN FRONTAL LEADS  No previous ECG available for comparison    Electronically Signed On 2019 13:58:45 PST by NIC QUIGLEY MD     URINALYSIS    Collection Time: 19  1:07 PM   Result Value Ref Range    Color Yellow     Character Clear     Specific Gravity 1.012 <1.035    Ph 8.0 5.0 - 8.0    Glucose Negative Negative mg/dL    Ketones Negative Negative mg/dL    Protein Negative Negative mg/dL    Bilirubin Negative Negative    Urobilinogen, Urine 0.2 Negative    Nitrite Negative Negative    Leukocyte Esterase Trace (A) Negative    Occult Blood Negative Negative    Micro Urine Req Microscopic    URINE MICROSCOPIC (W/UA)    Collection Time: 19  1:07 PM   Result Value Ref Range    WBC 0-2 /hpf    RBC 0-2 /hpf    Bacteria Few (A) None /hpf    Epithelial Cells Negative /hpf    Mucous Threads Rare /hpf    Hyaline Cast 6-10 (A) /lpf   ARTERIAL BLOOD GAS w/ O2 (LAB)    Collection Time: 19  1:54 PM   Result Value Ref Range    Ph 7.26 (LL) 7.40 - 7.50    Pco2 104.1 (HH) 26.0 - 37.0 mmHg    Po2 132.4 (H) 64.0 - 87.0 mmHg    O2 Saturation 98.0 93.0 - 99.0 %     Hco3 45 (H) 17 - 25 mmol/L    Base Excess 14 (H) -4 - 3 mmol/L    Body Temp 37.1 Centigrade    O2 Therapy 4.0 2.0 - 10.0 L/min    Ph -TC 7.26 (LL) 7.40 - 7.50    Pco2 -.6 (HH) 26.0 - 37.0 mmHg    Po2 -.0 (H) 64.0 - 87.0 mmHg       Imaging  DX-CHEST-PORTABLE (1 VIEW)   Final Result      Pulmonary edema, pleural effusions, basilar atelectasis and cardiac silhouette enlargement      CT-CTA CHEST PULMONARY ARTERY W/ RECONS    (Results Pending)       Assessment/Plan  Morbid obesity (HCC)- (present on admission)   Assessment & Plan    RD consult  Counseling     Diastolic congestive heart failure (HCC)- (present on admission)   Assessment & Plan    Forced diuresis with Lasix 40 mg twice daily     COPD (chronic obstructive pulmonary disease) (HCC)- (present on admission)   Assessment & Plan    RT/O2 Protocols  Titrate supplemental FiO2 to maintain SpO2 >88%  Solu-Medrol 40 mg every 6 hours  Azithromycin x5 days  Continue fluconazole, DuoNeb, and Anoro Ellipta  Continue noninvasive positive pressure ventilation as needed  Highly likely to progress to needing mechanical ventilation     Aortic stenosis- (present on admission)   Assessment & Plan    Moderate AS  Diuresis  Echo     Acute on chronic respiratory failure (HCC)- (present on admission)   Assessment & Plan    Hypoxic and hypercarbic  Likely related to acute exacerbation of HFpEF and AECOPD  Will diuresis until the patient reaches her dry weight which is approximately 215 pounds  Start Lasix  Start bronchodilators  Continue trilogy  Rescue BiPAP as needed  At high risk for worsening bleeding to need for intubation and mechanical ventilation     Elevated troponin- (present on admission)   Assessment & Plan    Likely type II NSTEMI due to heart failure  Trend troponin  Echocardiogram  Aspirin  Placed on heparin for any worsening         Discussed patient condition and risk of morbidity and/or mortality with Family, RN, RT, Pharmacy, [employer] [facility] resident, Patient  and ERP.      The patient remains critically ill.  Critical care time = 36 minutes in directly providing and coordinating critical care and extensive data review.  No time overlap and excludes procedures.       Bacterial Vaginosis

## 2021-01-06 NOTE — OB PROVIDER H&P - ASSESSMENT
Assessment  No prenatal issues. GBS neg (exp).    Plan  1. Admit to LND. Routine Labs. IVF.  2. IOL w/ PO Cytotec and CB when able  3. Fetus: cat 1 tracing. VTX. EFW 4281g by sono. Continuous EFM. Sono. Counseled pt on risk of shoulder dystocia given large EFW.   4. Prenatal issues: none  5. GBS neg (exp)  6. Pain: epidural PRN    Victorina Aponte, PGY-1  Patient discussed with attending physician, Dr. Beckham

## 2021-01-06 NOTE — OB PROVIDER H&P - NSHPPHYSICALEXAM_GEN_ALL_CORE
CV: RRR  Pulm: breathing comfortably on RA  Abd: gravid, nontender  Extr: moving all extremities with ease  – VE: 0/0/-3  – Sono: vertex

## 2021-01-06 NOTE — OB PROVIDER H&P - HISTORY OF PRESENT ILLNESS
Admission H&P    Subjective  HPI: 30yoF  at 41/1 presents for IOL for late term. +FM. -LOF. + irregular CTXs. -VB. Pt denies any other concerns.    PNC: Pt with UTI and BV treated during this pregnancy, otherwise denies prenatal issues. GBS neg (12/3).  EFW 4281g by luisito.    OBHx: none  GynHx: denies hx STIs, fibroids, polyps, cysts  PMH: denies hx clotting or bleeding disorders, HTN, DM  PSH: knee surgery  PFH: no hx congential disorders, bleeding/clotting disorders  Psych: denies   Social: denies etoh, smoking, drugs. Safe at home/in relationship.   Meds: PNV   Allergies: NKDA  Will accept blood transfusions? Yes           Admission H&P    Subjective  HPI: 30yoF  at 41/1 presents for IOL for late term. +FM. -LOF. + irregular CTXs. -VB. Pt denies any other concerns.    PNC: PCAP. Pt with UTI and BV treated during this pregnancy, otherwise denies prenatal issues. GBS neg (12/3).  EFW 4281g by luisito.    OBHx: none  GynHx: denies hx STIs, fibroids, polyps, cysts  PMH: denies hx clotting or bleeding disorders, HTN, DM  PSH: knee surgery  PFH: no hx congential disorders, bleeding/clotting disorders  Psych: denies   Social: denies etoh, smoking, drugs. Safe at home/in relationship.   Meds: PNV   Allergies: NKDA  Will accept blood transfusions? Yes

## 2021-01-07 ENCOUNTER — TRANSCRIPTION ENCOUNTER (OUTPATIENT)
Age: 31
End: 2021-01-07

## 2021-01-07 LAB
SARS-COV-2 IGG SERPL QL IA: NEGATIVE — SIGNIFICANT CHANGE UP
SARS-COV-2 IGM SERPL IA-ACNC: 0.06 INDEX — SIGNIFICANT CHANGE UP
T PALLIDUM AB TITR SER: NEGATIVE — SIGNIFICANT CHANGE UP

## 2021-01-07 RX ORDER — DIPHENHYDRAMINE HCL 50 MG
25 CAPSULE ORAL ONCE
Refills: 0 | Status: COMPLETED | OUTPATIENT
Start: 2021-01-07 | End: 2021-01-07

## 2021-01-07 RX ORDER — MORPHINE SULFATE 50 MG/1
4 CAPSULE, EXTENDED RELEASE ORAL ONCE
Refills: 0 | Status: DISCONTINUED | OUTPATIENT
Start: 2021-01-07 | End: 2021-01-07

## 2021-01-07 RX ORDER — ONDANSETRON 8 MG/1
4 TABLET, FILM COATED ORAL ONCE
Refills: 0 | Status: COMPLETED | OUTPATIENT
Start: 2021-01-07 | End: 2021-01-07

## 2021-01-07 RX ADMIN — MORPHINE SULFATE 4 MILLIGRAM(S): 50 CAPSULE, EXTENDED RELEASE ORAL at 05:29

## 2021-01-07 RX ADMIN — Medication 25 MILLIGRAM(S): at 13:50

## 2021-01-07 RX ADMIN — SODIUM CHLORIDE 125 MILLILITER(S): 9 INJECTION, SOLUTION INTRAVENOUS at 06:04

## 2021-01-07 RX ADMIN — ONDANSETRON 4 MILLIGRAM(S): 8 TABLET, FILM COATED ORAL at 09:09

## 2021-01-07 RX ADMIN — Medication 25 MILLIGRAM(S): at 20:57

## 2021-01-08 PROCEDURE — 59409 OBSTETRICAL CARE: CPT | Mod: U9,UB,GC

## 2021-01-08 RX ORDER — SODIUM CHLORIDE 9 MG/ML
1000 INJECTION, SOLUTION INTRAVENOUS
Refills: 0 | Status: DISCONTINUED | OUTPATIENT
Start: 2021-01-08 | End: 2021-01-10

## 2021-01-08 RX ORDER — ERTAPENEM SODIUM 1 G/1
1000 INJECTION, POWDER, LYOPHILIZED, FOR SOLUTION INTRAMUSCULAR; INTRAVENOUS ONCE
Refills: 0 | Status: COMPLETED | OUTPATIENT
Start: 2021-01-08 | End: 2021-01-09

## 2021-01-08 RX ORDER — ACETAMINOPHEN 500 MG
975 TABLET ORAL
Refills: 0 | Status: DISCONTINUED | OUTPATIENT
Start: 2021-01-08 | End: 2021-01-10

## 2021-01-08 RX ORDER — BUTORPHANOL TARTRATE 2 MG/ML
0.12 INJECTION, SOLUTION INTRAMUSCULAR; INTRAVENOUS EVERY 6 HOURS
Refills: 0 | Status: DISCONTINUED | OUTPATIENT
Start: 2021-01-09 | End: 2021-01-10

## 2021-01-08 RX ORDER — HYDROMORPHONE HYDROCHLORIDE 2 MG/ML
0.5 INJECTION INTRAMUSCULAR; INTRAVENOUS; SUBCUTANEOUS
Refills: 0 | Status: DISCONTINUED | OUTPATIENT
Start: 2021-01-09 | End: 2021-01-10

## 2021-01-08 RX ORDER — KETOROLAC TROMETHAMINE 30 MG/ML
30 SYRINGE (ML) INJECTION EVERY 6 HOURS
Refills: 0 | Status: COMPLETED | OUTPATIENT
Start: 2021-01-08 | End: 2021-01-09

## 2021-01-08 RX ORDER — AMPICILLIN TRIHYDRATE 250 MG
CAPSULE ORAL
Refills: 0 | Status: DISCONTINUED | OUTPATIENT
Start: 2021-01-08 | End: 2021-01-08

## 2021-01-08 RX ORDER — OXYCODONE HYDROCHLORIDE 5 MG/1
5 TABLET ORAL
Refills: 0 | Status: DISCONTINUED | OUTPATIENT
Start: 2021-01-08 | End: 2021-01-10

## 2021-01-08 RX ORDER — OXYTOCIN 10 UNIT/ML
333.33 VIAL (ML) INJECTION
Qty: 20 | Refills: 0 | Status: DISCONTINUED | OUTPATIENT
Start: 2021-01-08 | End: 2021-01-10

## 2021-01-08 RX ORDER — ACETAMINOPHEN 500 MG
975 TABLET ORAL ONCE
Refills: 0 | Status: COMPLETED | OUTPATIENT
Start: 2021-01-08 | End: 2021-01-08

## 2021-01-08 RX ORDER — METOCLOPRAMIDE HCL 10 MG
10 TABLET ORAL ONCE
Refills: 0 | Status: COMPLETED | OUTPATIENT
Start: 2021-01-08 | End: 2021-01-08

## 2021-01-08 RX ORDER — SIMETHICONE 80 MG/1
80 TABLET, CHEWABLE ORAL EVERY 4 HOURS
Refills: 0 | Status: DISCONTINUED | OUTPATIENT
Start: 2021-01-08 | End: 2021-01-10

## 2021-01-08 RX ORDER — ONDANSETRON 8 MG/1
4 TABLET, FILM COATED ORAL EVERY 6 HOURS
Refills: 0 | Status: DISCONTINUED | OUTPATIENT
Start: 2021-01-09 | End: 2021-01-10

## 2021-01-08 RX ORDER — OXYCODONE HYDROCHLORIDE 5 MG/1
10 TABLET ORAL
Refills: 0 | Status: DISCONTINUED | OUTPATIENT
Start: 2021-01-09 | End: 2021-01-10

## 2021-01-08 RX ORDER — LANOLIN
1 OINTMENT (GRAM) TOPICAL EVERY 6 HOURS
Refills: 0 | Status: DISCONTINUED | OUTPATIENT
Start: 2021-01-08 | End: 2021-01-10

## 2021-01-08 RX ORDER — TETANUS TOXOID, REDUCED DIPHTHERIA TOXOID AND ACELLULAR PERTUSSIS VACCINE, ADSORBED 5; 2.5; 8; 8; 2.5 [IU]/.5ML; [IU]/.5ML; UG/.5ML; UG/.5ML; UG/.5ML
0.5 SUSPENSION INTRAMUSCULAR ONCE
Refills: 0 | Status: DISCONTINUED | OUTPATIENT
Start: 2021-01-08 | End: 2021-01-10

## 2021-01-08 RX ORDER — IBUPROFEN 200 MG
600 TABLET ORAL EVERY 6 HOURS
Refills: 0 | Status: COMPLETED | OUTPATIENT
Start: 2021-01-08 | End: 2021-12-07

## 2021-01-08 RX ORDER — OXYCODONE HYDROCHLORIDE 5 MG/1
5 TABLET ORAL ONCE
Refills: 0 | Status: DISCONTINUED | OUTPATIENT
Start: 2021-01-08 | End: 2021-01-10

## 2021-01-08 RX ORDER — HEPARIN SODIUM 5000 [USP'U]/ML
5000 INJECTION INTRAVENOUS; SUBCUTANEOUS EVERY 12 HOURS
Refills: 0 | Status: DISCONTINUED | OUTPATIENT
Start: 2021-01-08 | End: 2021-01-10

## 2021-01-08 RX ORDER — OXYTOCIN 10 UNIT/ML
2 VIAL (ML) INJECTION
Qty: 30 | Refills: 0 | Status: DISCONTINUED | OUTPATIENT
Start: 2021-01-08 | End: 2021-01-08

## 2021-01-08 RX ORDER — DIPHENHYDRAMINE HCL 50 MG
25 CAPSULE ORAL EVERY 6 HOURS
Refills: 0 | Status: COMPLETED | OUTPATIENT
Start: 2021-01-08 | End: 2021-12-07

## 2021-01-08 RX ORDER — NALOXONE HYDROCHLORIDE 4 MG/.1ML
0.1 SPRAY NASAL
Refills: 0 | Status: DISCONTINUED | OUTPATIENT
Start: 2021-01-09 | End: 2021-01-10

## 2021-01-08 RX ORDER — FAMOTIDINE 10 MG/ML
20 INJECTION INTRAVENOUS ONCE
Refills: 0 | Status: COMPLETED | OUTPATIENT
Start: 2021-01-08 | End: 2021-01-08

## 2021-01-08 RX ORDER — OXYCODONE HYDROCHLORIDE 5 MG/1
5 TABLET ORAL
Refills: 0 | Status: DISCONTINUED | OUTPATIENT
Start: 2021-01-09 | End: 2021-01-10

## 2021-01-08 RX ORDER — CITRIC ACID/SODIUM CITRATE 300-500 MG
15 SOLUTION, ORAL ORAL ONCE
Refills: 0 | Status: COMPLETED | OUTPATIENT
Start: 2021-01-08 | End: 2021-01-08

## 2021-01-08 RX ORDER — AMPICILLIN TRIHYDRATE 250 MG
2 CAPSULE ORAL ONCE
Refills: 0 | Status: COMPLETED | OUTPATIENT
Start: 2021-01-08 | End: 2021-01-08

## 2021-01-08 RX ORDER — GENTAMICIN SULFATE 40 MG/ML
300 VIAL (ML) INJECTION ONCE
Refills: 0 | Status: COMPLETED | OUTPATIENT
Start: 2021-01-08 | End: 2021-01-08

## 2021-01-08 RX ORDER — AMPICILLIN TRIHYDRATE 250 MG
2 CAPSULE ORAL EVERY 6 HOURS
Refills: 0 | Status: CANCELLED | OUTPATIENT
Start: 2021-01-09 | End: 2021-01-08

## 2021-01-08 RX ORDER — MAGNESIUM HYDROXIDE 400 MG/1
30 TABLET, CHEWABLE ORAL
Refills: 0 | Status: DISCONTINUED | OUTPATIENT
Start: 2021-01-08 | End: 2021-01-10

## 2021-01-08 RX ADMIN — Medication 30 MILLIGRAM(S): at 22:30

## 2021-01-08 RX ADMIN — Medication 2 MILLIUNIT(S)/MIN: at 19:30

## 2021-01-08 RX ADMIN — Medication 2 MILLIUNIT(S)/MIN: at 17:14

## 2021-01-08 RX ADMIN — Medication 216 GRAM(S): at 20:14

## 2021-01-08 RX ADMIN — Medication 975 MILLIGRAM(S): at 20:14

## 2021-01-08 RX ADMIN — FAMOTIDINE 20 MILLIGRAM(S): 10 INJECTION INTRAVENOUS at 20:20

## 2021-01-08 RX ADMIN — Medication 15 MILLILITER(S): at 20:43

## 2021-01-08 RX ADMIN — Medication 0.25 MILLIGRAM(S): at 15:35

## 2021-01-08 RX ADMIN — Medication 500 MILLIGRAM(S): at 21:10

## 2021-01-08 RX ADMIN — Medication 2 MILLIUNIT(S)/MIN: at 11:57

## 2021-01-08 RX ADMIN — SODIUM CHLORIDE 125 MILLILITER(S): 9 INJECTION, SOLUTION INTRAVENOUS at 19:30

## 2021-01-08 RX ADMIN — Medication 10 MILLIGRAM(S): at 20:20

## 2021-01-08 RX ADMIN — Medication 1000 MILLIUNIT(S)/MIN: at 22:30

## 2021-01-08 NOTE — OB NEONATOLOGY/PEDIATRICIAN DELIVERY SUMMARY - NSPEDSNEONOTESA_OBGYN_ALL_OB_FT
41.3 wk male born to a 29 y/o  mother via CS. Maternal history not significant. Pregnancy complicated by bacterial vaginosis. Maternal blood type O+. Prenatal labs negative, non-reactive and immune. GBS negative on . AROM at 10:10 , light meconium.  Peds was called to delivery due to category 2 fetal heart tracing in the setting of chorioamnionitis (Tmax 38.4C). Amp given at 20:20, Gent given at 21:10. Baby was born vigorous and crying spontaneously. W/D/S. APGARS 8/9 for color. EOS >1, infant brought to NICU for 6-hour sepsis rule-out. Mom would like to breastfeed, consents to Hep B & circ. Pediatrician is Dr. Mojica.

## 2021-01-08 NOTE — OB RN INTRAOPERATIVE NOTE - NS_DISCHARGEDTO_OBGYN_ALL_OB
[address]  OPERATIVE REPORT    Name:  Abida Umaña  MR#:  [id number]  :  1964  ACCOUNT #:  [de-identified]  DATE OF SERVICE:  2020      PREPROCEDURE DIAGNOSES:  1. Gastroesophageal reflux symptoms. 2.  Noncardiac chest pain. 3.  Belching. POSTPROCEDURE DIAGNOSES:  1. Esophagogastric junction outflow obstruction. 2.  Peristalsis is present. 3.  20% of impedance boluses failed to clear completely. PROCEDURE PLANNED AND PERFORMED:  1. Esophageal manometry. 2.  Impedance esophageal manometry. SURGEON:  Anita Dill MD    ASSISTANTJesus Head. NOREEN Vergara    ANESTHESIA:  topical.    COMPLICATIONS:  none. SPECIMENS REMOVED:  none. IMPLANTS:  none. ESTIMATED BLOOD LOSS:  none. DESCRIPTION OF PROCEDURE:  High-resolution esophageal manometry was performed by the nursing staff with subsequent interpretation by Dr. Denisse Roldan. The lower esophageal sphincter is at 38.8 cm and for a distance of 2.4 cm distally. Lower esophageal sphincter pressures are as follows:  Respiratory minimum 82.2 (4.8-32), respiratory mean 88.8 (13-43), residual after swallowing 19 (less than 15). Wet swallows were administered. All are peristaltic by standard criteria. The mean amplitude in the distal esophagus is somewhat elevated at 174.6 (). There are 20% double peaked waves, normal is up to 15%. The velocity is slow at 2.6 cm/s. High-resolution scoring is as follows: DCI elevated 6114.4 (500 to 5000). Contractile front velocity 2.5 (less than 9)intervals pressure LES elevated 14.3 normal less than 8.4 intervals pressure average Max body of the esophagus 26.1 normal less than 17. [place] score is as follows: Distal latency 7.6% failed 0% paraesophageal pressurization 0% premature 10th percent rapid 0% large break 0% small breaks 10. Impedance manometry is performed of the flavor electrolyte solution. 2 of 10 impedance boluses fail to clear completely. Comment:  In the setting of esophagogastric junction outflow obstruction the second the the changes in the esophageal body especially the high DCI and high pressures are not scored. Esophago-junction esophagogastric junction outflow obstruction can be seen in hiatal hernia Schatzki ring infiltrative disease including malignancy achalasia and eosinophilic esophagitis. Endoscopic ultrasound is no longer considered mandatory. Recommend correlation with barium radiography endoscopic findings.   Charanjit Muhammad MD      [id number]  D:  12/01/2020 8:25  T:  12/01/2020 14:37  JOB #:  [id number]  CC:  MD Dimitry Bell MD L&D Pacu

## 2021-01-08 NOTE — OB RN DELIVERY SUMMARY - NS_SEPSISRSKCALC_OBGYN_ALL_OB_FT
EOS calculated successfully. EOS Risk Factor: 0.5/1000 live births ([facility] national incidence); GA=41w3d; Temp=101.12; ROM=11.167; GBS='Negative'; Antibiotics='Broad spectrum antibiotics 2-3.9 hrs prior to birth'

## 2021-01-08 NOTE — OB PROVIDER LABOR PROGRESS NOTE - NS_SUBJECTIVE/OBJECTIVE_OBGYN_ALL_OB_FT
R1 OB Labor Note    S: Patient seen and examined at bedside.     T(C): 38.4 (01-08-21 @ 19:56), Max: 38.4 (01-08-21 @ 19:56)  HR: 81 (01-08-21 @ 20:17) (60 - 115)  BP: 111/61 (01-08-21 @ 20:05) (100/54 - 138/75)  BP(mean): --  ABP: --  ABP(mean): --  RR: --  SpO2: 99% (01-08-21 @ 20:17) (92% - 100%)  Wt(kg): --  CVP(mm Hg): --  CI: --  CAPILLARY BLOOD GLUCOSE       N/A      01-07 @ 07:01  -  01-08 @ 07:00  --------------------------------------------------------  IN:    Lactated Ringers: 1375 mL  Total IN: 1375 mL    OUT:    Voided (mL): 1200 mL  Total OUT: 1200 mL    Total NET: 175 mL      01-08 @ 07:01  -  01-08 @ 20:21  --------------------------------------------------------  IN:    Lactated Ringers: 1875 mL  Total IN: 1875 mL    OUT:    Indwelling Catheter - Urethral (mL): 950 mL  Total OUT: 950 mL    Total NET: 925 mL

## 2021-01-08 NOTE — OB PROVIDER LABOR PROGRESS NOTE - ASSESSMENT
LTIOL  -with CB in place  - c/w po cytotec 
 IOL for LT. PT febrile, will give A/G/T. Given tracing, pt for c/s.       Victorina Aponte, PGY-1  Discussed at safety rounds with Dr. Eric Aldridge

## 2021-01-08 NOTE — OB RN DELIVERY SUMMARY - AS DELIV COMPLICATIONS OB
abnormal fetal heart rate tracing/chorioamnionitis/meconium stained fluid abnormal fetal heart rate tracing/chorioamnionitis/maternal fever/meconium stained fluid

## 2021-01-08 NOTE — OB RN INTRAOPERATIVE NOTE - NS_SURGICALCHECK_OBGYN_ALL_OB
Back Strain: Care Instructions  Your Care Instructions    Back strain happens when you overstretch, or pull, a muscle in your back. You may hurt your back in an accident or when you exercise or lift something. Most back pain will get better with rest and time. You can take care of yourself at home to help your back heal.  Follow-up care is a key part of your treatment and safety. Be sure to make and go to all appointments, and call your doctor if you are having problems. It's also a good idea to know your test results and keep a list of the medicines you take. How can you care for yourself at home? · Try to stay as active as you can, but stop or reduce any activity that causes pain. · Put ice or a cold pack on the sore muscle for 10 to 20 minutes at a time to stop swelling. Try this every 1 to 2 hours for 3 days (when you are awake) or until the swelling goes down. Put a thin cloth between the ice pack and your skin. · After 2 or 3 days, apply a heating pad on low or a warm cloth to your back. Some doctors suggest that you go back and forth between hot and cold treatments. · Take pain medicines exactly as directed. ¨ If the doctor gave you a prescription medicine for pain, take it as prescribed. ¨ If you are not taking a prescription pain medicine, ask your doctor if you can take an over-the-counter medicine. · Try sleeping on your side with a pillow between your legs. Or put a pillow under your knees when you lie on your back. These measures can ease pain in your lower back. · Return to your usual level of activity slowly. When should you call for help? Call 911 anytime you think you may need emergency care. For example, call if:  ? · You are unable to move a leg at all. ?Call your doctor now or seek immediate medical care if:  ? · You have new or worse symptoms in your legs, belly, or buttocks. Symptoms may include:  ¨ Numbness or tingling. ¨ Weakness. ¨ Pain.    ? · You lose bladder or bowel control. ? Watch closely for changes in your health, and be sure to contact your doctor if you are not getting better as expected. Where can you learn more? Go to http://arlene-joann.info/. Enter M755 in the search box to learn more about \"Back Strain: Care Instructions. \"  Current as of: March 21, 2017  Content Version: 11.4  © 0411-6336 [employer]. Care instructions adapted under license by [employer] (which disclaims liability or warranty for this information). If you have questions about a medical condition or this instruction, always ask your healthcare professional. [place] [address] any warranty or liability for your use of this information. Back Stretches: Exercises  Your Care Instructions  Here are some examples of exercises for stretching your back. Start each exercise slowly. Ease off the exercise if you start to have pain. Your doctor or physical therapist will tell you when you can start these exercises and which ones will work best for you. How to do the exercises  Overhead stretch    1. Stand comfortably with your feet shoulder-width apart. 2. Looking straight ahead, raise both arms over your head and reach toward the ceiling. Do not allow your head to tilt back. 3. Hold for 15 to 30 seconds, then lower your arms to your sides. 4. Repeat 2 to 4 times. Side stretch    1. Stand comfortably with your feet shoulder-width apart. 2. Raise one arm over your head, and then lean to the other side. 3. Slide your hand down your leg as you let the weight of your arm gently stretch your side muscles. Hold for 15 to 30 seconds. 4. Repeat 2 to 4 times on each side. Press-up    1. Lie on your stomach, supporting your body with your forearms. 2. Press your elbows down into the floor to raise your upper back. As you do this, relax your stomach muscles and allow your back to arch without using your back muscles.  As your press up, do not let your hips or pelvis come off the floor. 3. Hold for 15 to 30 seconds, then relax. 4. Repeat 2 to 4 times. Relax and rest    1. Lie on your back with a rolled towel under your neck and a pillow under your knees. Extend your arms comfortably to your sides. 2. Relax and breathe normally. 3. Remain in this position for about 10 minutes. 4. If you can, do this 2 or 3 times each day. Follow-up care is a key part of your treatment and safety. Be sure to make and go to all appointments, and call your doctor if you are having problems. It's also a good idea to know your test results and keep a list of the medicines you take. Where can you learn more? Go to http://arlene-joann.info/. Enter N839 in the search box to learn more about \"Back Stretches: Exercises. \"  Current as of: March 21, 2017  Content Version: 11.4  © 4452-5753 [employer]. Care instructions adapted under license by [employer] (which disclaims liability or warranty for this information). If you have questions about a medical condition or this instruction, always ask your healthcare professional. [address] any warranty or liability for your use of this information. Yes

## 2021-01-08 NOTE — OB RN DELIVERY SUMMARY - NS_LABORCHARACTER_OBGYN_ALL_OB
Induction of labor-AROM/Febrile (>38C)/External electronic FM/Antibiotics in labor/Meconium staining/Chorioamnionitis

## 2021-01-09 LAB
BASOPHILS # BLD AUTO: 0 K/UL — SIGNIFICANT CHANGE UP (ref 0–0.2)
BASOPHILS NFR BLD AUTO: 0 % — SIGNIFICANT CHANGE UP (ref 0–2)
EOSINOPHIL # BLD AUTO: 0 K/UL — SIGNIFICANT CHANGE UP (ref 0–0.5)
EOSINOPHIL NFR BLD AUTO: 0 % — SIGNIFICANT CHANGE UP (ref 0–6)
HCT VFR BLD CALC: 40 % — SIGNIFICANT CHANGE UP (ref 34.5–45)
HGB BLD-MCNC: 13.4 G/DL — SIGNIFICANT CHANGE UP (ref 11.5–15.5)
IANC: 17.25 K/UL — HIGH (ref 1.5–8.5)
LYMPHOCYTES # BLD AUTO: 0.68 K/UL — LOW (ref 1–3.3)
LYMPHOCYTES # BLD AUTO: 3.5 % — LOW (ref 13–44)
MCHC RBC-ENTMCNC: 29 PG — SIGNIFICANT CHANGE UP (ref 27–34)
MCHC RBC-ENTMCNC: 33.5 GM/DL — SIGNIFICANT CHANGE UP (ref 32–36)
MCV RBC AUTO: 86.6 FL — SIGNIFICANT CHANGE UP (ref 80–100)
MONOCYTES # BLD AUTO: 0.35 K/UL — SIGNIFICANT CHANGE UP (ref 0–0.9)
MONOCYTES NFR BLD AUTO: 1.8 % — LOW (ref 2–14)
NEUTROPHILS # BLD AUTO: 17.63 K/UL — HIGH (ref 1.8–7.4)
NEUTROPHILS NFR BLD AUTO: 73.4 % — SIGNIFICANT CHANGE UP (ref 43–77)
PLATELET # BLD AUTO: 226 K/UL — SIGNIFICANT CHANGE UP (ref 150–400)
RBC # BLD: 4.62 M/UL — SIGNIFICANT CHANGE UP (ref 3.8–5.2)
RBC # FLD: 14.7 % — HIGH (ref 10.3–14.5)
WBC # BLD: 19.54 K/UL — HIGH (ref 3.8–10.5)
WBC # FLD AUTO: 19.54 K/UL — HIGH (ref 3.8–10.5)

## 2021-01-09 RX ORDER — FAMOTIDINE 10 MG/ML
20 INJECTION INTRAVENOUS ONCE
Refills: 0 | Status: COMPLETED | OUTPATIENT
Start: 2021-01-09 | End: 2021-01-09

## 2021-01-09 RX ORDER — IBUPROFEN 200 MG
600 TABLET ORAL EVERY 6 HOURS
Refills: 0 | Status: DISCONTINUED | OUTPATIENT
Start: 2021-01-09 | End: 2021-01-10

## 2021-01-09 RX ORDER — DIPHENHYDRAMINE HCL 50 MG
25 CAPSULE ORAL ONCE
Refills: 0 | Status: COMPLETED | OUTPATIENT
Start: 2021-01-09 | End: 2021-01-09

## 2021-01-09 RX ORDER — FERROUS SULFATE 325(65) MG
325 TABLET ORAL DAILY
Refills: 0 | Status: DISCONTINUED | OUTPATIENT
Start: 2021-01-09 | End: 2021-01-10

## 2021-01-09 RX ORDER — SENNA PLUS 8.6 MG/1
1 TABLET ORAL
Refills: 0 | Status: DISCONTINUED | OUTPATIENT
Start: 2021-01-09 | End: 2021-01-10

## 2021-01-09 RX ORDER — DIPHENHYDRAMINE HCL 50 MG
25 CAPSULE ORAL EVERY 6 HOURS
Refills: 0 | Status: DISCONTINUED | OUTPATIENT
Start: 2021-01-09 | End: 2021-01-10

## 2021-01-09 RX ORDER — KETOROLAC TROMETHAMINE 30 MG/ML
30 SYRINGE (ML) INJECTION EVERY 6 HOURS
Refills: 0 | Status: DISCONTINUED | OUTPATIENT
Start: 2021-01-09 | End: 2021-01-10

## 2021-01-09 RX ORDER — MEDROXYPROGESTERONE ACETATE 150 MG/ML
150 INJECTION, SUSPENSION, EXTENDED RELEASE INTRAMUSCULAR ONCE
Refills: 0 | Status: DISCONTINUED | OUTPATIENT
Start: 2021-01-10 | End: 2021-01-10

## 2021-01-09 RX ADMIN — ERTAPENEM SODIUM 120 MILLIGRAM(S): 1 INJECTION, POWDER, LYOPHILIZED, FOR SOLUTION INTRAMUSCULAR; INTRAVENOUS at 05:44

## 2021-01-09 RX ADMIN — HEPARIN SODIUM 5000 UNIT(S): 5000 INJECTION INTRAVENOUS; SUBCUTANEOUS at 17:02

## 2021-01-09 RX ADMIN — Medication 975 MILLIGRAM(S): at 21:51

## 2021-01-09 RX ADMIN — Medication 25 MILLIGRAM(S): at 02:04

## 2021-01-09 RX ADMIN — HEPARIN SODIUM 5000 UNIT(S): 5000 INJECTION INTRAVENOUS; SUBCUTANEOUS at 06:06

## 2021-01-09 RX ADMIN — Medication 30 MILLIGRAM(S): at 10:59

## 2021-01-09 RX ADMIN — SIMETHICONE 80 MILLIGRAM(S): 80 TABLET, CHEWABLE ORAL at 21:57

## 2021-01-09 RX ADMIN — Medication 975 MILLIGRAM(S): at 05:44

## 2021-01-09 RX ADMIN — FAMOTIDINE 20 MILLIGRAM(S): 10 INJECTION INTRAVENOUS at 03:44

## 2021-01-09 RX ADMIN — Medication 30 MILLIGRAM(S): at 17:02

## 2021-01-09 RX ADMIN — BUTORPHANOL TARTRATE 0.12 MILLIGRAM(S): 2 INJECTION, SOLUTION INTRAMUSCULAR; INTRAVENOUS at 02:35

## 2021-01-09 RX ADMIN — Medication 25 MILLIGRAM(S): at 14:27

## 2021-01-09 RX ADMIN — Medication 25 MILLIGRAM(S): at 00:27

## 2021-01-09 NOTE — LACTATION INITIAL EVALUATION - LACTATION INTERVENTIONS
initiate skin to skin/initiate hand expression routine/initiate/review early breastfeeding management guidelines/initiate/review techniques for position and latch/initiate/review breast massage/compression

## 2021-01-09 NOTE — LACTATION INITIAL EVALUATION - AS EVIDENCED BY
assisted with deep latch and positioning  discussed  signs  of  effective  feeding and  swallowing.  discussed  compression at  breast when  nbn  stops  drinking  and  is  still sucking.  instructed  to offer both  breast at a feeding ,feed on cue and safe  skin to skin. reviewed with  mother breastfeeding section  of  postpartum  book.  reviewed with  mother Irish  literature  on breastfeeding  in postpartum  book/observation

## 2021-01-09 NOTE — CHART NOTE - NSCHARTNOTEFT_GEN_A_CORE
Med rec complete per pt at bedside    Allergies reviewed and updated.      
NP note    Pt seen for increased pain sp morphine with minimal relief    T(C): 36.8 (07 Jan 2021 05:26), Max: 36.8 (07 Jan 2021 05:26)  T(F): 98.2 (07 Jan 2021 05:26), Max: 98.2 (07 Jan 2021 05:26)  HR: 72 (07 Jan 2021 06:38) (66 - 88)  BP: 104/57 (07 Jan 2021 05:26) (97/54 - 116/69)  RR: 18 (07 Jan 2021 05:26) (18 - 18)  SpO2: 98% (07 Jan 2021 06:38) (96% - 98%)  /moderate variability/- accels/no decels  [place] q2-3min  SVE C/L/P    31 y/o 41+2w LTIOL cat I tracing     Continue po cytotec  For epidural followed by cervical balloon    sredze, NP
PGY1 Labor & Delivery Progress Note     Pt re-examined for cervical change      SVE:1/50/-3  EFM: baseline 140 ,moderate variability, -accels, -decels   [place]: q2-3 min      T(C): 36.5 (01-07-21 @ 16:59), Max: 36.8 (01-07-21 @ 05:26)  HR: 73 (01-07-21 @ 17:20) (58 - 105)  BP: 108/62 (01-07-21 @ 17:20) (93/50 - 130/71)  RR: 16 (01-07-21 @ 16:59) (16 - 18)  SpO2: 97% (01-07-21 @ 17:23) (95% - 100%)      - continue IOL with CB and PO cytotec    D/w Dr Opal Montoya MD  PGY-1
R1 Progress Note    Patient seen and examined at bedside for increased variable decelerations. Pt comfortable.    NAD  Vital Signs Last 24 Hrs  T(C): 37.1 (2021 12:00), Max: 37.1 (2021 12:00)  T(F): 98.78 (2021 12:00), Max: 98.78 (2021 12:00)  HR: 75 (2021 13:33) (60 - 108)  BP: 124/63 (2021 13:33) (102/55 - 127/59)  BP(mean): --  RR: 16 (2021 16:59) (16 - 16)  SpO2: 95% (2021 13:32) (92% - 98%)    SVE 4/70/-3  EFM baseline 150, moderate variability, +accels, variable & few late decels, Cat 2  TOCO q3min    A/P 30y  being induced for PROM.       - continue induction with pitocin   - fetus: cat 2 FHT  - pain: no complaints  - potential to start amnioinfusion if variable decels continue    d/w Dr. Cristy Hopkins, PGY1
R3 Chart Note    Went to eval patient because  requesting that Pitocin be turned off because he thinks she is allergic to it. Patient reports mild facial swelling improved after first benadryl dose. Denies throat swelling, chest pain shortness of breath.    Vital Signs Last 24 Hrs  T(C): 36.8 (08 Jan 2021 22:15), Max: 38.4 (08 Jan 2021 19:56)  T(F): 98.2 (08 Jan 2021 22:15), Max: 101.12 (08 Jan 2021 19:56)  HR: 63 (09 Jan 2021 01:30) (60 - 116)  BP: 139/83 (09 Jan 2021 01:00) (100/54 - 140/84)  BP(mean): 97 (09 Jan 2021 01:00) (74 - 125)  RR: 15 (09 Jan 2021 01:30) (13 - 20)  SpO2: 95% (09 Jan 2021 01:30) (88% - 100%)    Gen: NAD  Face: mild edema with erythema centrally    A/P: Patient POD#1 from [facility] for cat2 tracing c/b chorio s/p A/G/T, for Invanz, EBL: 508, s/p Methergine IM. Went to eval patient for mild facial swelling. VSS. Satting well on RA. No SOB symptoms. Significant time spent counseling patient's partner about the reaction likely NOT being from pitocin as patient was on pitocin all day and it is similar to the body's natural hormone. Explained increased risk for bleeding without pitocin administration. Patient and partner amenable for continuation at this time.  - Unable to identify culprit for mild allergic reaction given multiple medications given immediately before during and after C/S  - Additional Benadryl 25mg IVP now  - Discussed case with anesthesia, they gave patient Decadron 4mg IVP in OR, will continue to follow  - Monitor for symptoms closely    CGreyson No PGY3  d/w Dr. Becker
NP note    Pt seen for placement of cervical balloon    T(C): 36.5 (07 Jan 2021 08:56), Max: 36.8 (07 Jan 2021 05:26)  T(F): 97.7 (07 Jan 2021 08:56), Max: 98.2 (07 Jan 2021 05:26)  HR: 68 (07 Jan 2021 11:53) (61 - 105)  BP: 116/57 (07 Jan 2021 11:50) (93/50 - 130/71)  RR: 17 (07 Jan 2021 08:56) (17 - 18)  SpO2: 97% (07 Jan 2021 11:53) (96% - 100%)  EFM Cat I   [place] q2-4min  SVE 0.5/50/-3    Cervical balloon placed without incident. Pt tolerated well. Instilled with 60ccs in uterine/vaginal balloons.     -Maintain cervical balloon  -Continue PO cytotec    elina, NP
R1 Progress Note    Patient seen and examined at bedside. Cervical balloon tugged & displaced. Pt AROm'd & IUPC placed.    NAD  Vital Signs Last 24 Hrs  T(C): 36.6 (2021 05:59), Max: 36.6 (2021 12:50)  T(F): 97.88 (2021 05:59), Max: 97.88 (2021 12:50)  HR: 78 (2021 10:18) (58 - 108)  BP: 116/66 (2021 10:18) (93/50 - 127/59)  BP(mean): --  RR: 16 (2021 16:59) (16 - 17)  SpO2: 96% (2021 10:17) (92% - 98%)    SVE 3.5/60/-3  EFM baseline 145, moderate latrell, +accels, -decels  TOCO q3    A/P 30y  being induced for PROM.      - pt to be started on pitocin  - fetus: cat 1 FHT  - gbs: neg  - pain: no complaints    Eden Hopkins, PGY1
S:  Pt seen and examined for cervical change     O:   T(C): 36.8 (14:00)  HR: 86 (16:12)  BP: 101/50 (16:12)  RR: 16 (16:59)  SpO2: 100% (16:02)  SVE: 5/60/-3  EFM: baseline 140, min-mod variability, recurrent late decels for last 30 min no accel  [place]: q1-3 min    A/P: 30y P0 late term IOL  tracing reviewed, previously category 1 now appears to have tachysystole causing fetal distress   - d/c pitocin  -administer terbutaline    TLal PGY4

## 2021-01-09 NOTE — PROVIDER CONTACT NOTE (OTHER) - ASSESSMENT
alert, oriented with complains of itchiness and redness over the face. Vital signs stable , Denies any complains sob. alert, oriented with complains of itchiness and redness over the face. Vital signs stable , Denies any complains of  sob, chest tightness.

## 2021-01-09 NOTE — LACTATION INITIAL EVALUATION - ABORTIONS, OB PROFILE
Neurosurgery Progress Note    Subjective:  Pre op:  Lt leg pain secondary to huge central and left L3-4 HNP    Exam:   giveway on Lt.   Rt    BP  Min: 109/69  Max: 136/79  Pulse  Av  Min: 60  Max: 72  Resp  Av.8  Min: 17  Max: 18  Temp  Av.7 °C (98.1 °F)  Min: 36.2 °C (97.1 °F)  Max: 37.1 °C (98.7 °F)  SpO2  Av %  Min: 91 %  Max: 96 %    No data recorded    Recent Labs     20  0843 20  0948 20  0332   WBC 8.0 9.4 11.0*   RBC 4.86 4.86 4.38   HEMOGLOBIN 14.6 14.5 13.1   HEMATOCRIT 44.4 45.2 39.6   MCV 91.4 93.0 90.4   MCH 30.0 29.8 29.9   MCHC 32.9* 32.1* 33.1*   RDW 39.8 41.8 39.5   PLATELETCT 315 264 267   MPV 10.0 9.4 9.5     Recent Labs     20  0843 20  0948 20  0332   SODIUM 137 139 140   POTASSIUM 4.1 4.1 4.0   CHLORIDE 106 106 106   CO2 24 24 26   GLUCOSE 72 104* 82   BUN 8 16 16   CREATININE 0.95 0.98 0.82   CALCIUM 9.2 9.6 9.1     Recent Labs     20  0328   APTT 22.7*   INR 0.99     Recent Labs     20  0328   REACTMIN 2.2*   CLOTKINET 1.9   CLOTANGL 66.0   MAXCLOTS 61.1   [id number] 0.0   PRCINADP 0.0   PRCINAA 23.6       Intake/Output       20 07 - 20 0659 20 - 20 0659       Total  Total       Intake    P.O.  720  150 870  --  -- --    P.O. 720 150 870 -- -- --    Total Intake 720 150 870 -- -- --       Output    Urine  --  -- --  --  -- --    Number of Times Voided 3 x 1 x 4 x -- -- --    Total Output -- -- -- -- -- --       Net I/O     720 150 870 -- -- --            Intake/Output Summary (Last 24 hours) at 2020 0639  Last data filed at 2020 1950  Gross per 24 hour   Intake 870 ml   Output --   Net 870 ml            • albuterol      • [MAR Hold] nicotine  21 mg Daily-0600   • [MAR Hold] predniSONE  60 mg DAILY   • [MAR Hold] gabapentin  600 mg TID   • [MAR Hold] haloperidol  5 mg TID   • [MAR Hold] trihexyphenidyl  4 mg QHS   • [MAR Hold] senna-docusate  2  Tab BID    And   • [MAR Hold] polyethylene glycol/lytes  1 Packet QDAY PRN    And   • [MAR Hold] magnesium hydroxide  30 mL QDAY PRN    And   • [MAR Hold] bisacodyl  10 mg QDAY PRN   • [MAR Hold] acetaminophen  650 mg Q6HRS PRN   • [MAR Hold] enalaprilat  1.25 mg Q6HRS PRN   • [MAR Hold] ondansetron  4 mg Q4HRS PRN   • [MAR Hold] ondansetron  4 mg Q4HRS PRN   • [MAR Hold] promethazine  12.5-25 mg Q4HRS PRN   • [MAR Hold] promethazine  12.5-25 mg Q4HRS PRN   • [MAR Hold] prochlorperazine  5-10 mg Q4HRS PRN   • [MAR Hold] lidocaine  1 Patch Q24HR   • [MAR Hold] oxyCODONE-acetaminophen  1 Tab Q4HRS PRN       Assessment and Plan:  L3-4 arceo/discectomy this am.  Major risk , 1 % (paralysis).  Small risk csf leak.  Recurrent surgery rate about 15 %.  Pt willing to proceed.   0

## 2021-01-09 NOTE — LACTATION INITIAL EVALUATION - INTERVENTION OUTCOME
nbn demonstrated  deep latch and  performed  with sucking and swallowing  noted .  nbn  less than  24 hours  and  reviewed   strategies to wake  nbn  . encouraged  to ask  for  assistance .  rn aware./verbalizes understanding

## 2021-01-10 ENCOUNTER — TRANSCRIPTION ENCOUNTER (OUTPATIENT)
Age: 31
End: 2021-01-10

## 2021-01-10 VITALS
SYSTOLIC BLOOD PRESSURE: 105 MMHG | OXYGEN SATURATION: 99 % | DIASTOLIC BLOOD PRESSURE: 60 MMHG | RESPIRATION RATE: 18 BRPM | HEART RATE: 82 BPM | TEMPERATURE: 98 F

## 2021-01-10 RX ORDER — IBUPROFEN 200 MG
1 TABLET ORAL
Qty: 0 | Refills: 0 | DISCHARGE
Start: 2021-01-10

## 2021-01-10 RX ORDER — ACETAMINOPHEN 500 MG
3 TABLET ORAL
Qty: 0 | Refills: 0 | DISCHARGE
Start: 2021-01-10

## 2021-01-10 RX ADMIN — Medication 975 MILLIGRAM(S): at 13:28

## 2021-01-10 RX ADMIN — HEPARIN SODIUM 5000 UNIT(S): 5000 INJECTION INTRAVENOUS; SUBCUTANEOUS at 05:49

## 2021-01-10 RX ADMIN — Medication 1 TABLET(S): at 13:28

## 2021-01-10 RX ADMIN — Medication 325 MILLIGRAM(S): at 13:32

## 2021-01-10 RX ADMIN — Medication 600 MILLIGRAM(S): at 02:46

## 2021-01-10 RX ADMIN — Medication 600 MILLIGRAM(S): at 09:29

## 2021-01-10 RX ADMIN — SIMETHICONE 80 MILLIGRAM(S): 80 TABLET, CHEWABLE ORAL at 02:47

## 2021-01-10 RX ADMIN — OXYCODONE HYDROCHLORIDE 5 MILLIGRAM(S): 5 TABLET ORAL at 05:40

## 2021-01-10 RX ADMIN — OXYCODONE HYDROCHLORIDE 5 MILLIGRAM(S): 5 TABLET ORAL at 01:36

## 2021-01-10 NOTE — PROGRESS NOTE ADULT - ATTENDING COMMENTS
Pt seen and examined.  Pt with no complaints at time of evaluation this morning.  Denies HA, CP, SOB, Calf pain, fevers/chills.  Pt reported tolerating reg diet -N/-V, +flatus, -BM.  Voiding and ambulating without difficulty.  Pt reports decreasing lochia.      VSS  General: NAD ambulaing  Abd: softly distended, tympanic to percussion, ATTP  Incision: C/D/i with steristrips in place  Ext: no calf tenderness b/l    POD #2 pt now with return of bowel function.  Plan to dc home today.    Aanbelle Solomon MD
Pt seen and examined, agree with above resident note. Pt with no complaints at time of evaluation.  Denies HA, CP, SOB, calf pain, fevers/chills.  Pt tolerating reg diet -N/-V,- flatus/ -BM.  Voiding and ambulating without difficulty.  Pt reports decreasing lochia.    VSS  Hct 35.9-->40    General: NAD  Abd: softly distended, tympanic to percussion.  Fundus firm, ATTP  Incision: C/D/I  ext: no calf tenderness b/l      POD #1 sp PLTCS 2/2 category II FHT, course complicated by chorioamnionitis s/p antibiotics   1.  Chorioamnionitis - sp Invanz, continue to monitor until 24 hrs afebrile  2.  Awaiting flatus- encourage ambulation  3.  Continue routine postpartum and postop care    Anabelle Solomon MD

## 2021-01-10 NOTE — PROGRESS NOTE ADULT - ASSESSMENT
29y/o  POD#2 from [facility] for NRFHT c/b chorio s/p A/G/T/Azithro/Ancef/Invanz in stable condition. Diffuse allergic reaction on POD#0 likely 2/2 anesthesia with no airway compromise noted, resolved. 
31y/o  POD#1 from [facility] for NRFHT c/b chorio s/p A/G/T/Azithro/Ancef/Invanz in stable condition. Diffuse allergic reaction likely 2/2 anesthesia with no airway compromise noted.

## 2021-01-10 NOTE — DISCHARGE NOTE OB - CARE PROVIDER_API CALL
NELLA,   4-6 weeks at Ambulatory Clinic Unit, [facility], Oncology Building, [facility].  Phone: [phone]  Fax: (   )    -  Follow Up Time:

## 2021-01-10 NOTE — DISCHARGE NOTE OB - MEDICATION SUMMARY - MEDICATIONS TO TAKE
I will START or STAY ON the medications listed below when I get home from the hospital:    Vit D  -- Indication: For home med     acetaminophen 325 mg oral tablet  -- 3 tab(s) by mouth   -- Indication: For for pain as needed     ibuprofen 600 mg oral tablet  -- 1 tab(s) by mouth every 6 hours  -- Indication: For for pain as needed     PNV Prenatal oral tablet  -- 1 tab(s) by mouth once a day  -- Indication: For home med

## 2021-01-10 NOTE — DISCHARGE NOTE OB - CARE PLAN
Principal Discharge DX:	 delivery delivered  Goal:	recovery  Assessment and plan of treatment:	Make your follow-up appointment with your doctor as ordered. Call the office to schedule an incision check in 2 weeks and a postpartum check in 6 weeks.  No heavy lifting, driving, or strenuous activity for 6 weeks. Nothing per vagina such as tampons, intercourse, douches or tub baths for 6 weeks or until you see your doctor. Call your doctor with any signs and symptoms of infection such as fever, chills, nausea or vomiting. Call your doctor with redness or swelling at the incision site, fluid leakage or wound separation. Call your doctor if you’re unable to tolerate food, increase in vaginal bleeding, or have difficulty urinating. Call your doctor if you have pain that is not relieved by your prescribed medications. Notify your doctor with any other concerns. Call [phone] if you have any of these concerns in the next 6 weeks.

## 2021-01-10 NOTE — DISCHARGE NOTE OB - PLAN OF CARE
recovery Make your follow-up appointment with your doctor as ordered. Call the office to schedule an incision check in 2 weeks and a postpartum check in 6 weeks.  No heavy lifting, driving, or strenuous activity for 6 weeks. Nothing per vagina such as tampons, intercourse, douches or tub baths for 6 weeks or until you see your doctor. Call your doctor with any signs and symptoms of infection such as fever, chills, nausea or vomiting. Call your doctor with redness or swelling at the incision site, fluid leakage or wound separation. Call your doctor if you’re unable to tolerate food, increase in vaginal bleeding, or have difficulty urinating. Call your doctor if you have pain that is not relieved by your prescribed medications. Notify your doctor with any other concerns. Call [phone] if you have any of these concerns in the next 6 weeks.

## 2021-01-10 NOTE — PROGRESS NOTE ADULT - PROBLEM SELECTOR PLAN 1
- Continue with po analgesia  - Increase ambulation  - Continue regular diet  - d/c IV fluids  - Check CBC  - D/c Haro  - Incision dressing removed  - Depo ordered for BC  - Benadryl PRN for itching     Victorina Aponte, PGY-1
- Continue with po analgesia  - Increase ambulation  - Continue regular diet  - Depo ordered for BC  - Benadryl PRN for itching   - Discharge planning    Kym Montoya, PGY-1

## 2021-01-10 NOTE — DISCHARGE NOTE OB - PATIENT PORTAL LINK FT
You can access the FollowMyHealth Patient Portal offered by [facility] by registering at the following website: [email or web address]. By joining [facility]’s FollowMyHealth portal, you will also be able to view your health information using other applications (apps) compatible with our system.

## 2021-01-10 NOTE — DISCHARGE NOTE OB - PROVIDER TOKENS
FREE:[LAST:[ACU],PHONE:[phone]],FAX:[(   [phone]    -],ADDRESS:[4-6 weeks at Ambulatory Clinic Unit, [facility], Oncology [facility], [facility].]]

## 2021-01-10 NOTE — DISCHARGE NOTE OB - ADDITIONAL INSTRUCTIONS
Please follow-up in 2 weeks for an incision check and for a postpartum appointment in 4-6 weeks at Ambulatory Clinic Unit, [facility], [facility], [facility]. Please call the office for an appointment phone # [phone]

## 2021-01-10 NOTE — PROGRESS NOTE ADULT - SUBJECTIVE AND OBJECTIVE BOX
ANESTHESIA POSTOP CHECK    30y Female POSTOP DAY 1     Mild pruritis, treatment offered.    NO APPARENT ANESTHESIA COMPLICATIONS      
POST OP DAY  1  s/p   SECTION    SUBJECTIVE:  I'm ok.    PAIN SCALE SCORE: [x] Refer to charted pain scores    THERAPY:  [  ] Spinal morphine   [ x ] Epidural morphine   [  ] IV PCA Hydromorphone 1 mg/ml    OBJECTIVE:  Comfortable Appearing    SEDATION SCORE:	  [ x ] Alert	    [  ] Drowsy        [  ] Arousable	[  ] Asleep	[  ] Unresponsive    Side Effects:	  [   ] None	     [  ] Nausea        [ x ] Pruritus        [  ] Weakness   [  ] Numbness        ASSESSMENT/ PLAN   [   ] Discontinue         [  ] Continue    [ x ] Change to prn Analgesics as per primary service.    DOCUMENTATION & VERIFICATION OF CURRENT MEDS [ x ] Done    COMMENTS: No Headache.  Mild Pruritis, medication explained and offered to the patient.
OB Progress Note: LTCS, POD#2    S: 29yo POD#2 s/p LTCS. Pain well controlled, tolerating regular diet, voiding spontaneously, ambulating without difficulty. Denies passing flatus. Denies F/C,  heavy vaginal bleeding, CP/SOB, lightheadedness/dizziness, nausea/vomiting,     O:  Vitals:  Vital Signs Last 24 Hrs  T(C): 36.5 (10 Sheldon 2021 05:50), Max: 36.9 (09 Jan 2021 13:43)  T(F): 97.7 (10 Sheldon 2021 05:50), Max: 98.5 (10 Sheldon 2021 01:43)  HR: 75 (10 Sheldon 2021 05:50) (69 - 89)  BP: 98/66 (10 Sheldon 2021 05:50) (92/58 - 122/73)  BP(mean): --  RR: 18 (10 Sheldon 2021 05:50) (17 - 18)  SpO2: 99% (10 Sheldon 2021 05:50) (97% - 99%)    MEDICATIONS  (STANDING):  acetaminophen   Tablet .. 975 milliGRAM(s) Oral <User Schedule>  diphtheria/tetanus/pertussis (acellular) Vaccine (ADAcel) 0.5 milliLiter(s) IntraMuscular once  ferrous    sulfate 325 milliGRAM(s) Oral daily  heparin   Injectable 5000 Unit(s) SubCutaneous every 12 hours  ibuprofen  Tablet. 600 milliGRAM(s) Oral every 6 hours  lactated ringers. 1000 milliLiter(s) (75 mL/Hr) IV Continuous <Continuous>  medroxyPROGESTERone depot Injectable 150 milliGRAM(s) IntraMuscular once  oxytocin Infusion 333.333 milliUNIT(s)/Min (1000 mL/Hr) IV Continuous <Continuous>  prenatal multivitamin 1 Tablet(s) Oral daily      MEDICATIONS  (PRN):  butorphanol Injectable 0.125 milliGRAM(s) IV Push every 6 hours PRN Pruritus  diphenhydrAMINE 25 milliGRAM(s) Oral every 6 hours PRN Pruritus  HYDROmorphone  Injectable 0.5 milliGRAM(s) IV Push every 3 hours PRN Moderate Pain (4 - 6)  lanolin Ointment 1 Application(s) Topical every 6 hours PRN Sore Nipples  magnesium hydroxide Suspension 30 milliLiter(s) Oral two times a day PRN Constipation  naloxone Injectable 0.1 milliGRAM(s) IV Push every 3 minutes PRN For ANY of the following changes in patient status:  A. RR LESS THAN 10 breaths per minute, B. Oxygen saturation LESS THAN 90%, C. Sedation score of 6  ondansetron Injectable 4 milliGRAM(s) IV Push every 6 hours PRN Nausea  oxyCODONE    IR 5 milliGRAM(s) Oral every 3 hours PRN Mild Pain (1 - 3)  oxyCODONE    IR 10 milliGRAM(s) Oral every 3 hours PRN Moderate Pain (4 - 6)  oxyCODONE    IR 5 milliGRAM(s) Oral every 3 hours PRN Moderate to Severe Pain (4-10)  oxyCODONE    IR 5 milliGRAM(s) Oral once PRN Moderate to Severe Pain (4-10)  senna 1 Tablet(s) Oral two times a day PRN Constipation  simethicone 80 milliGRAM(s) Chew every 4 hours PRN Gas      Labs:  Blood type: O Positive  Rubella IgG: RPR: Negative                          13.4   19.54<H> >-----------< 226    ( 01-09 @ 06:43 )             40.0      PE:  General: NAD  Abdomen: Soft, appropriately tender, Fundus firm incision c/d/i.  VE: No heavy vaginal bleeding  Extremities: No erythema, no pitting edema    
Patient seen and examined at bedside, no acute overnight events. No acute concerns, pain well controlled. Patient is not yet ambulating and tolerating clear diet. Has not yet passed flatus. Haro in place. Denies CP, SOB, N/V, HA, blurred vision, epigastric pain. Does have diffuse itchy rash and swelling of lips, hands and feet.     Vital Signs Last 24 Hours  T(C): 36.7 (01-09-21 @ 01:00), Max: 38.4 (01-08-21 @ 19:56)  HR: 57 (01-09-21 @ 05:00) (57 - 116)  BP: 132/87 (01-09-21 @ 05:00) (100/54 - 144/96)  RR: 16 (01-09-21 @ 05:00) (13 - 20)  SpO2: 95% (01-09-21 @ 05:00) (91% - 100%)    I&O's Summary    07 Jan 2021 07:01  -  08 Jan 2021 07:00  --------------------------------------------------------  IN: 1375 mL / OUT: 1200 mL / NET: 175 mL    08 Jan 2021 07:01  -  09 Jan 2021 05:29  --------------------------------------------------------  IN: 2775 mL / OUT: 2513 mL / NET: 262 mL        Physical Exam:  General: NAD  HEENT: No laryngeal edema noted, swelling of lips noted  Pulm: CTAB  Abdomen: Soft, non-tender, non-distended, fundus firm  Incision: Pfannensteil incision CDI, subcuticular suture closure  Pelvic: Lochia wnl    Labs:    Blood Type: O Positive  Antibody Screen: Negative  RPR: Negative               11.8   9.06  )-----------( 231      ( 01-06 @ 22:25 )             35.9         MEDICATIONS  (STANDING):  acetaminophen   Tablet .. 975 milliGRAM(s) Oral <User Schedule>  diphtheria/tetanus/pertussis (acellular) Vaccine (ADAcel) 0.5 milliLiter(s) IntraMuscular once  ertapenem  IVPB 1000 milliGRAM(s) IV Intermittent once  heparin   Injectable 5000 Unit(s) SubCutaneous every 12 hours  ibuprofen  Tablet. 600 milliGRAM(s) Oral every 6 hours  ketorolac   Injectable 30 milliGRAM(s) IV Push every 6 hours  lactated ringers. 1000 milliLiter(s) (75 mL/Hr) IV Continuous <Continuous>  oxytocin Infusion 333.333 milliUNIT(s)/Min (1000 mL/Hr) IV Continuous <Continuous>  oxytocin Infusion 333.333 milliUNIT(s)/Min (1000 mL/Hr) IV Continuous <Continuous>  prenatal multivitamin 1 Tablet(s) Oral daily    MEDICATIONS  (PRN):  butorphanol Injectable 0.125 milliGRAM(s) IV Push every 6 hours PRN Pruritus  diphenhydrAMINE 25 milliGRAM(s) Oral every 6 hours PRN Pruritus  lanolin Ointment 1 Application(s) Topical every 6 hours PRN Sore Nipples  magnesium hydroxide Suspension 30 milliLiter(s) Oral two times a day PRN Constipation  oxyCODONE    IR 5 milliGRAM(s) Oral every 3 hours PRN Moderate to Severe Pain (4-10)  oxyCODONE    IR 5 milliGRAM(s) Oral once PRN Moderate to Severe Pain (4-10)  simethicone 80 milliGRAM(s) Chew every 4 hours PRN Gas

## 2021-01-13 DIAGNOSIS — Z34.03 ENCOUNTER FOR SUPERVISION OF NORMAL FIRST PREGNANCY, THIRD TRIMESTER: ICD-10-CM

## 2021-01-22 ENCOUNTER — MED ADMIN CHARGE (OUTPATIENT)
Age: 31
End: 2021-01-22

## 2021-01-22 ENCOUNTER — APPOINTMENT (OUTPATIENT)
Dept: OBGYN | Facility: HOSPITAL | Age: 31
End: 2021-01-22

## 2021-01-22 ENCOUNTER — NON-APPOINTMENT (OUTPATIENT)
Age: 31
End: 2021-01-22

## 2021-01-22 ENCOUNTER — OUTPATIENT (OUTPATIENT)
Dept: OUTPATIENT SERVICES | Facility: HOSPITAL | Age: 31
LOS: 1 days | End: 2021-01-22

## 2021-01-22 VITALS
HEART RATE: 69 BPM | DIASTOLIC BLOOD PRESSURE: 55 MMHG | WEIGHT: 131 LBS | BODY MASS INDEX: 23.21 KG/M2 | SYSTOLIC BLOOD PRESSURE: 95 MMHG | TEMPERATURE: 97.7 F | HEIGHT: 63 IN

## 2021-01-22 DIAGNOSIS — R14.1 GAS PAIN: ICD-10-CM

## 2021-01-22 DIAGNOSIS — Z34.03 ENCOUNTER FOR SUPERVISION OF NORMAL FIRST PREGNANCY, THIRD TRIMESTER: ICD-10-CM

## 2021-01-22 DIAGNOSIS — Z13.71 ENCOUNTER FOR NONPROCREATIVE SCREENING FOR GENETIC DISEASE CARRIER STATUS: ICD-10-CM

## 2021-01-22 DIAGNOSIS — O26.813 PREGNANCY RELATED EXHAUSTION AND FATIGUE, THIRD TRIMESTER: ICD-10-CM

## 2021-01-22 DIAGNOSIS — Z34.90 ENCOUNTER FOR SUPERVISION OF NORMAL PREGNANCY, UNSPECIFIED, UNSPECIFIED TRIMESTER: ICD-10-CM

## 2021-01-22 DIAGNOSIS — O99.613 DISEASES OF THE DIGESTIVE SYSTEM COMPLICATING PREGNANCY, THIRD TRIMESTER: ICD-10-CM

## 2021-01-22 DIAGNOSIS — K59.00 DISEASES OF THE DIGESTIVE SYSTEM COMPLICATING PREGNANCY, THIRD TRIMESTER: ICD-10-CM

## 2021-01-22 DIAGNOSIS — Z87.898 PERSONAL HISTORY OF OTHER SPECIFIED CONDITIONS: ICD-10-CM

## 2021-01-22 DIAGNOSIS — Z92.29 PERSONAL HISTORY OF OTHER DRUG THERAPY: ICD-10-CM

## 2021-01-22 RX ORDER — HYDROCORTISONE 25 MG/G
2.5 CREAM TOPICAL
Qty: 1 | Refills: 0 | Status: ACTIVE | COMMUNITY
Start: 2020-12-01

## 2021-01-22 RX ORDER — MEDROXYPROGESTERONE ACETATE 150 MG/ML
150 INJECTION, SUSPENSION INTRAMUSCULAR
Qty: 0 | Refills: 0 | Status: COMPLETED | OUTPATIENT
Start: 2021-01-22

## 2021-01-22 RX ADMIN — MEDROXYPROGESTERONE ACETATE 0 MG/ML: 150 INJECTION, SUSPENSION INTRAMUSCULAR at 00:00

## 2021-01-22 NOTE — HISTORY OF PRESENT ILLNESS
[Postpartum Follow Up] : postpartum follow up [Complications:___] : no complications [Delivery Date: ___] : on [unfilled] [Primary C/S] : delivered by  section [Male] : Delivery History: baby boy [Wt. ___] : weighing [unfilled] [Rhogam] : Rhogam was not administered [Rubella Vaccine] : Rubella vaccine was not administered [Pertussis Vaccine] : Pertussis vaccine administered [BTL] : no tubal ligation [Breastfeeding] : currently nursing [Resumed Menses] : has not resumed her menses [Intended Contraception] : Intended Contraception: [Resumed [place]] : has not resumed intercourse [Medroxyprogesterone Injection] : medroxyprogesterone acetate injection [None] : No associated symptoms are reported [Healed] : healed [de-identified] : Here for wound check.  Feeling well.  Is Measles and Varicella non immune.  Would like to start Depo today. [de-identified] : MMR today.  Depo given Risks and instructions reviewed. Continue PNV's while nursing.  RTC 4 weeks.  Will get Varicella #1 at that visit.

## 2021-01-27 DIAGNOSIS — Z30.013 ENCOUNTER FOR INITIAL PRESCRIPTION OF INJECTABLE CONTRACEPTIVE: ICD-10-CM

## 2021-01-27 DIAGNOSIS — Z98.891 HISTORY OF UTERINE SCAR FROM PREVIOUS SURGERY: ICD-10-CM

## 2021-01-29 DIAGNOSIS — Z3A.40 40 WEEKS GESTATION OF PREGNANCY: ICD-10-CM

## 2021-01-29 DIAGNOSIS — O48.0 POST-TERM PREGNANCY: ICD-10-CM

## 2021-01-29 DIAGNOSIS — O26.843 UTERINE SIZE-DATE DISCREPANCY, THIRD TRIMESTER: ICD-10-CM

## 2021-02-19 ENCOUNTER — RESULT REVIEW (OUTPATIENT)
Age: 31
End: 2021-02-19

## 2021-02-19 ENCOUNTER — APPOINTMENT (OUTPATIENT)
Dept: OBGYN | Facility: HOSPITAL | Age: 31
End: 2021-02-19

## 2021-02-19 ENCOUNTER — OUTPATIENT (OUTPATIENT)
Dept: OUTPATIENT SERVICES | Facility: HOSPITAL | Age: 31
LOS: 1 days | End: 2021-02-19

## 2021-02-19 VITALS
SYSTOLIC BLOOD PRESSURE: 109 MMHG | WEIGHT: 128 LBS | HEART RATE: 72 BPM | HEIGHT: 63 IN | TEMPERATURE: 97.5 F | BODY MASS INDEX: 22.68 KG/M2 | DIASTOLIC BLOOD PRESSURE: 62 MMHG

## 2021-02-19 DIAGNOSIS — Z98.891 HISTORY OF UTERINE SCAR FROM PREVIOUS SURGERY: ICD-10-CM

## 2021-02-19 RX ORDER — HYDROCORTISONE 25 MG/G
2.5 CREAM TOPICAL TWICE DAILY
Qty: 1 | Refills: 1 | Status: ACTIVE | COMMUNITY
Start: 2021-02-19 | End: 1900-01-01

## 2021-02-19 NOTE — HISTORY OF PRESENT ILLNESS
[Postpartum Follow Up] : postpartum follow up [Complications:___] : no complications [Last Pap Date: ___] : Last Pap Date: [unfilled] [Delivery Date: ___] : on [unfilled] [Primary C/S] : delivered by  section [Male] : Delivery History: baby boy [Wt. ___] : weighing [unfilled] [Rhogam] : Rhogam was not administered [Rubella Vaccine] : Rubella vaccine was not administered [Pertussis Vaccine] : Pertussis vaccine administered [BTL] : no tubal ligation [Breastfeeding] : currently nursing [Discharge HCT: ___] : hematocrit level was [unfilled] [Discharge HGB: ___] : hemoglobin level was [unfilled] [Resumed Menses] : has not resumed her menses [Resumed [place]] : has not resumed intercourse [Intended Contraception] : Intended Contraception: [Medroxyprogesterone Injection] : medroxyprogesterone acetate injection [Healed] : healed [Back to Normal] : is back to normal in size [Normal] : the vagina was normal [External Hemorrhoid] : an external hemorrhoid [None] : None [Doing Well] : is doing well [de-identified] : Currently on Depo Provera [de-identified] : Complaining of constipation and hemorrhoid discomfort [de-identified] : Patient complaining of finger joint swelling and pain upon waking, which improves as the day goes on.  She reports that her sister has RA.  Also having pain from hemorrhoid probably exacerebated by constipation.  States she is using MiraLAX sporadically.  She is enjoying being a mom and is very  happy. [de-identified] : Advised to use miraLAX daily until she starts to have some improvement.  Rx sent for [facility].  Rheumatology referral.  CBC, ESR, TIMBO, CRP and rheumatoid factor today.  Varivax # 1 today.  RTC for next Depo Provera between 4/9-4/22 2021

## 2021-02-23 DIAGNOSIS — M25.449 EFFUSION, UNSPECIFIED HAND: ICD-10-CM

## 2021-02-23 DIAGNOSIS — Z98.891 HISTORY OF UTERINE SCAR FROM PREVIOUS SURGERY: ICD-10-CM

## 2021-03-23 ENCOUNTER — APPOINTMENT (OUTPATIENT)
Dept: OBGYN | Facility: HOSPITAL | Age: 31
End: 2021-03-23

## 2021-04-09 ENCOUNTER — APPOINTMENT (OUTPATIENT)
Dept: OBGYN | Facility: HOSPITAL | Age: 31
End: 2021-04-09

## 2021-04-27 ENCOUNTER — OUTPATIENT (OUTPATIENT)
Dept: OUTPATIENT SERVICES | Facility: HOSPITAL | Age: 31
LOS: 1 days | End: 2021-04-27

## 2021-04-27 ENCOUNTER — RESULT CHARGE (OUTPATIENT)
Age: 31
End: 2021-04-27

## 2021-04-27 ENCOUNTER — APPOINTMENT (OUTPATIENT)
Dept: OBGYN | Facility: HOSPITAL | Age: 31
End: 2021-04-27

## 2021-04-27 VITALS
WEIGHT: 131 LBS | HEART RATE: 71 BPM | TEMPERATURE: 98.2 F | HEIGHT: 63 IN | BODY MASS INDEX: 23.21 KG/M2 | SYSTOLIC BLOOD PRESSURE: 117 MMHG | DIASTOLIC BLOOD PRESSURE: 62 MMHG

## 2021-04-27 LAB
HCG UR QL: NEGATIVE
QUALITY CONTROL: YES

## 2021-04-27 RX ORDER — MEDROXYPROGESTERONE ACETATE 150 MG/ML
150 INJECTION, SUSPENSION INTRAMUSCULAR
Qty: 0 | Refills: 0 | Status: COMPLETED | OUTPATIENT
Start: 2021-04-27

## 2021-04-27 RX ADMIN — MEDROXYPROGESTERONE ACETATE 0 MG/ML: 150 INJECTION, SUSPENSION INTRAMUSCULAR at 00:00

## 2021-04-27 NOTE — DISCUSSION/SUMMARY
[FreeTextEntry1] : 31 yo presents for depo provera without complaint. \par \par 1. Depo\par -doing well, 4 days late today but denies unprotected intercourse \par -given today\par -advised vaginal dryness can occur with depo but is mostly likely 2/2 breastfeeding, can use water based lubrication\par \par 2. Varicella NI\par -Varivax #2 given today\par \par RTC 7/14-7/28

## 2021-04-27 NOTE — HISTORY OF PRESENT ILLNESS
[FreeTextEntry1] : 29 yo presents for depo provera without complaint. She is having some vaginal dryness migdalia with intercourse. She is also due for varicella vaccine #2 today

## 2021-04-28 DIAGNOSIS — Z30.42 ENCOUNTER FOR SURVEILLANCE OF INJECTABLE CONTRACEPTIVE: ICD-10-CM

## 2021-06-03 ENCOUNTER — EMERGENCY (EMERGENCY)
Facility: HOSPITAL | Age: 31
LOS: 1 days | Discharge: HOME | End: 2021-06-03
Attending: EMERGENCY MEDICINE | Admitting: EMERGENCY MEDICINE
Payer: MEDICAID

## 2021-06-03 VITALS
OXYGEN SATURATION: 100 % | DIASTOLIC BLOOD PRESSURE: 67 MMHG | TEMPERATURE: 99 F | RESPIRATION RATE: 16 BRPM | SYSTOLIC BLOOD PRESSURE: 108 MMHG | HEART RATE: 90 BPM

## 2021-06-03 PROCEDURE — 99283 EMERGENCY DEPT VISIT LOW MDM: CPT

## 2021-06-03 RX ORDER — IBUPROFEN 200 MG
800 TABLET ORAL ONCE
Refills: 0 | Status: COMPLETED | OUTPATIENT
Start: 2021-06-03 | End: 2021-06-03

## 2021-06-03 RX ADMIN — Medication 800 MILLIGRAM(S): at 14:55

## 2021-06-03 NOTE — ED PROVIDER NOTE - CPE EDP CARDIAC NORM
FYHEATHER apparently she didn't make an appointment. I think she got a missed appointment letter.  
From: Meggan Lynn  To: Milo Cazares  Sent: 12/9/2020 4:49 PM CST  Subject: Other    Dr. Cazares, I received your letter and I'd like you to know that I never made the appointment. Please speak to your staff     Thank you   Meggan Lynn  
normal...

## 2021-06-03 NOTE — ED PROVIDER NOTE - PROGRESS NOTE DETAILS
patient has her own soft, wrist splint which will wear  discussed pain management and follow up  patient agreeable with plan  -md benjamín

## 2021-06-03 NOTE — ED PROVIDER NOTE - NSFOLLOWUPINSTRUCTIONS_ED_ALL_ED_FT
Ibuprofen 600mg every 6 hours for pain  Wear wrist brace  Follow up with hand surgeon  Return to ER for new or worsening symptoms

## 2021-06-03 NOTE — ED PROVIDER NOTE - CLINICAL SUMMARY MEDICAL DECISION MAKING FREE TEXT BOX
right thumb/wrist pain  patient took tylenol  clinically tendinits, likely dequervains  patient has wrist splint

## 2021-06-03 NOTE — ED PROVIDER NOTE - PATIENT PORTAL LINK FT
You can access the FollowMyHealth Patient Portal offered by [facility] by registering at the following website: [email or web address]. By joining [facility]’s FollowMyHealth portal, you will also be able to view your health information using other applications (apps) compatible with our system.

## 2021-06-03 NOTE — ED PROVIDER NOTE - OBJECTIVE STATEMENT
29 yo female with no pmhx c/o pain to right wrist/thumb x 2 weeks. denies any fall or direct trauma. patient has a 4 month old baby, that she is lifting and carrying  denies fever or chills  currently breast feeding  patient has taken tylenol for pain, and bought a splint

## 2021-06-03 NOTE — ED PROVIDER NOTE - PHYSICAL EXAMINATION
right hand: from at elbow and wrist  2+ radial and ulna pulses   pain with extension right thumb  ttp along thumb/extensor and abductor tendons  no erythema  no warmth  sensation intact  good cap refill    no bony tenderness  no deformity

## 2021-06-03 NOTE — ED PROVIDER NOTE - CARE PROVIDER_API CALL
Raúl Martell)  Plastic Surgery; Surgery; Surgical Critical Care  [address] [place] [address]  Phone: [phone]  Fax: [phone]  Follow Up Time:

## 2021-07-20 ENCOUNTER — APPOINTMENT (OUTPATIENT)
Dept: OBGYN | Facility: HOSPITAL | Age: 31
End: 2021-07-20

## 2021-08-03 NOTE — OB RN DELIVERY SUMMARY - NS_DELIVERYNEONATOLOGIST1_OBGYN_ALL_OB_FT
[facility]  General Surgery Progress Note    Patient: Trisha Jaffe Date of Service: 8/3/2021   MRN: [id number] Time: 8:52 AM   YOB: 1949 Length of Stay: 1 days     As soon as I walked into the room he told me that \"I do not think you will remember me, but you took care of me in 2018\".  Patient known to me from previous admissions, last time I saw him and he was here was in 2018.  That was his eighth episode of small bowel obstruction following reversal of his Rocco procedure elsewhere.  All 8 episodes had resolved without surgical intervention    24h events:  More distended overnight, NGT placed. 900 returned immediate.  Since that time only about 50 mL in the canister.  Patient feels better after NGT placed. No BM or flatus. HDS. White count normal. Afebrile.     Review of Systems:  Constitutional: Negative except as documented in history of present illness.  Eye: Negative except as documented in history of present illness.  Ear/Nose/Mouth/Throat: Negative except as documented in history of present illness.  Cardiovascular: Negative except as documented in history of present illness.  Respiratory: Negative except as documented in history of present illness.  Gastrointestinal: Negative except as documented in history of present illness.  Genitourinary: Negative except as documented in history of present illness.  Musculoskeletal: Negative except as documented in history of present illness.  Integumentary: Negative except as documented in history of present illness.  Hematology/Lymphatics: Negative except as documented in history of present illness.  Neurologic: Negative except as documented in history of present illness.  Endocrine: Negative except as documented in history of present illness.  Allergy/Immunologic: Negative except as documented in history of present illness.  Psychiatric: Negative except as documented in history of present illness.     Intake/Output:  Date  08/02/21 0700 - 08/03/21 0659 08/03/21 0700 - 08/04/21 0659   Shift 4733-8401 5728-6224 6846-9347 24 Hour Total 2363-1443 5227-2062 4225-5735 24 Hour Total   INTAKE   I.V.  100 1155 1255       Shift Total  100 1155 1255       OUTPUT   Drains   900 900         GI Tube Output (mL) (GI Tube 08/03/21 Nasogastric Left Nare)   900 900       Shift Total   900 900       Weight (kg) 71.4 71.4 71.4 71.4 71.4 71.4 71.4 71.4       Vitals:    Vital Last Value 24 Hour Range   Temperature 98.4 °F (36.9 °C) (08/03/21 0733) Temp  Min: 97.3 °F (36.3 °C)  Max: 98.4 °F (36.9 °C)   Pulse 81 (08/03/21 0733) Pulse  Min: 61  Max: 81   Respiratory 12 (08/03/21 0733) Resp  Min: 12  Max: 18   Non-Invasive  Blood Pressure (!) 149/88 (08/03/21 0733) BP  Min: 149/88  Max: 169/89   Pulse Oximetry 94 % (08/03/21 0733) SpO2  Min: 92 %  Max: 99 %   Arterial   Blood Pressure   No data recorded       Physical Exam:  General: No acute distress, awake, appears stated age  Head: Normocephalic, atraumatic  Eyes: Conjunctivae normal normal.   ENT: MM moist.   Neck: Supple, soft  Chest: No lesions, no ecchymosis, nontender  Lungs: Clear breath sounds, unlabored breathing, no wheeze.  Heart: Sinus rhythm, regular rate, no murmur  Abdomen: Mild distended. Soft. Midline scar. Abdomen non tender in all quadrants.   Genitourinary: normal anatomy  Extremities: Warm and well perfused, no swelling  Neurologic: A&Ox3, GCS 15, no gross deficits.  Psychiatric: normal affect and mood        Labs:  Recent Labs   Lab 08/03/21  0512   WBC 6.0   RBC 5.37   HGB 16.6   HCT 48.7        Recent Labs   Lab 08/03/21  0512 08/01/21  2347   SODIUM 136 137   POTASSIUM 3.9 4.2   CHLORIDE 103 107   CO2 28 26   BUN 5* 8   CREATININE 1.07 1.09   GLUCOSE 117* 111*   CALCIUM 9.2 9.2     Recent Labs   Lab 08/03/21  0512   SODIUM 136   POTASSIUM 3.9   CHLORIDE 103   CO2 28   BUN 5*   CREATININE 1.07   CALCIUM 9.2   ALBUMIN 3.2*   BILIRUBIN 0.6   ALKPT 113   GPT 12   AST 12   GLUCOSE  117*     Microbiology Results     None            Radiology:  No results found.      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  This is currently his ninth episode of P SBO.     -900 mL out once the NG was placed, subsequently output has been minimal about 50 mL since, benign abdominal examination, patient remains non toxic appearing. Normal white count. No peritoneal signs on exam. Continue non operative management at this time.   -NGT to LIWS.   -NPO  -Recommend out of bed and ambulating TID  - Limit narcotics.     The patient was seen along with the nurse practitioner.  I have performed an independent physical examination, evaluation and care management plans.  I agree with the assessment and plan as detailed above  40 minutes of noncritical care time spent in evaluation in evaluation and management as well as care coordination     Montse Earl MD

## 2021-10-04 NOTE — DISCHARGE NOTE OB - COMPLETE THE FOLLOWING IF THE PATIENT REFUSES THE INFLUENZA VACCINE:
Ashanti Orta  was notified of Common Hereditary Cancer panel paired with RNA analysis through [facility]. This is NEGATIVE for any pathogenic variants or variants of uncertain significance.  Ashanti Orta     was sent a copy of these results and results will be scanned into EPIC.             We discussed that these results can mean one of a few things: 1). Ashanti Orta    has an undetectable gene mutation in one of the above mentioned genes, 2). there is another, unidentified gene associated with the cancers in the family, 3). the cancer in the family is due to a combination of unknown environmental and genetic factors. Screening should be based on family history.      Given the rapid pace of development of genetic testing, Ashanti Orta    was instructed to check in annually or if there are any changes in personal or family histories of cancer as further testing may be indicated.    
Vaccine Information Sheet (VIS) provided-VIS date: 8/15/19

## 2021-11-16 ENCOUNTER — APPOINTMENT (OUTPATIENT)
Dept: OBGYN | Facility: HOSPITAL | Age: 31
End: 2021-11-16

## 2021-11-16 ENCOUNTER — MED ADMIN CHARGE (OUTPATIENT)
Age: 31
End: 2021-11-16

## 2021-11-16 ENCOUNTER — OUTPATIENT (OUTPATIENT)
Dept: OUTPATIENT SERVICES | Facility: HOSPITAL | Age: 31
LOS: 1 days | End: 2021-11-16

## 2021-11-16 ENCOUNTER — RESULT CHARGE (OUTPATIENT)
Age: 31
End: 2021-11-16

## 2021-11-16 VITALS
SYSTOLIC BLOOD PRESSURE: 104 MMHG | TEMPERATURE: 98.5 F | HEIGHT: 63 IN | BODY MASS INDEX: 23.35 KG/M2 | HEART RATE: 69 BPM | WEIGHT: 131.8 LBS | DIASTOLIC BLOOD PRESSURE: 56 MMHG

## 2021-11-16 DIAGNOSIS — Z30.42 ENCOUNTER FOR SURVEILLANCE OF INJECTABLE CONTRACEPTIVE: ICD-10-CM

## 2021-11-16 DIAGNOSIS — Z00.00 ENCOUNTER FOR GENERAL ADULT MEDICAL EXAMINATION WITHOUT ABNORMAL FINDINGS: ICD-10-CM

## 2021-11-16 RX ORDER — MEDROXYPROGESTERONE ACETATE 150 MG/ML
150 INJECTION, SUSPENSION INTRAMUSCULAR
Qty: 0 | Refills: 0 | Status: COMPLETED | OUTPATIENT
Start: 2021-11-16

## 2021-11-16 RX ADMIN — MEDROXYPROGESTERONE ACETATE 0 MG/ML: 150 INJECTION, SUSPENSION INTRAMUSCULAR at 00:00

## 2021-12-08 ENCOUNTER — RESULT REVIEW (OUTPATIENT)
Age: 31
End: 2021-12-08

## 2021-12-08 ENCOUNTER — OUTPATIENT (OUTPATIENT)
Dept: OUTPATIENT SERVICES | Facility: HOSPITAL | Age: 31
LOS: 1 days | End: 2021-12-08

## 2021-12-08 ENCOUNTER — APPOINTMENT (OUTPATIENT)
Dept: OBGYN | Facility: HOSPITAL | Age: 31
End: 2021-12-08

## 2021-12-08 VITALS
WEIGHT: 130 LBS | TEMPERATURE: 97.6 F | SYSTOLIC BLOOD PRESSURE: 106 MMHG | BODY MASS INDEX: 23.04 KG/M2 | HEIGHT: 63 IN | HEART RATE: 77 BPM | DIASTOLIC BLOOD PRESSURE: 64 MMHG

## 2021-12-08 LAB
A1C WITH ESTIMATED AVERAGE GLUCOSE RESULT: 5.4 % — SIGNIFICANT CHANGE UP (ref 4–5.6)
ALBUMIN SERPL ELPH-MCNC: 4.8 G/DL — SIGNIFICANT CHANGE UP (ref 3.3–5)
ALP SERPL-CCNC: 79 U/L — SIGNIFICANT CHANGE UP (ref 40–120)
ALT FLD-CCNC: 12 U/L — SIGNIFICANT CHANGE UP (ref 4–33)
AMPHET UR-MCNC: NEGATIVE — SIGNIFICANT CHANGE UP
ANION GAP SERPL CALC-SCNC: 12 MMOL/L — SIGNIFICANT CHANGE UP (ref 7–14)
AST SERPL-CCNC: 15 U/L — SIGNIFICANT CHANGE UP (ref 4–32)
BARBITURATES UR SCN-MCNC: NEGATIVE — SIGNIFICANT CHANGE UP
BASOPHILS # BLD AUTO: 0.01 K/UL — SIGNIFICANT CHANGE UP (ref 0–0.2)
BASOPHILS NFR BLD AUTO: 0.1 % — SIGNIFICANT CHANGE UP (ref 0–2)
BENZODIAZ UR-MCNC: NEGATIVE — SIGNIFICANT CHANGE UP
BILIRUB SERPL-MCNC: 0.7 MG/DL — SIGNIFICANT CHANGE UP (ref 0.2–1.2)
BUN SERPL-MCNC: 12 MG/DL — SIGNIFICANT CHANGE UP (ref 7–23)
CALCIUM SERPL-MCNC: 9.7 MG/DL — SIGNIFICANT CHANGE UP (ref 8.4–10.5)
CHLORIDE SERPL-SCNC: 105 MMOL/L — SIGNIFICANT CHANGE UP (ref 98–107)
CO2 SERPL-SCNC: 24 MMOL/L — SIGNIFICANT CHANGE UP (ref 22–31)
COCAINE METAB.OTHER UR-MCNC: NEGATIVE — SIGNIFICANT CHANGE UP
CREAT SERPL-MCNC: 0.63 MG/DL — SIGNIFICANT CHANGE UP (ref 0.5–1.3)
CREATININE URINE RESULT, DAU: 172 MG/DL — SIGNIFICANT CHANGE UP
EOSINOPHIL # BLD AUTO: 0.16 K/UL — SIGNIFICANT CHANGE UP (ref 0–0.5)
EOSINOPHIL NFR BLD AUTO: 2.4 % — SIGNIFICANT CHANGE UP (ref 0–6)
ESTIMATED AVERAGE GLUCOSE: 108 — SIGNIFICANT CHANGE UP
GLUCOSE SERPL-MCNC: 78 MG/DL — SIGNIFICANT CHANGE UP (ref 70–99)
HCT VFR BLD CALC: 44.3 % — SIGNIFICANT CHANGE UP (ref 34.5–45)
HGB BLD-MCNC: 15.1 G/DL — SIGNIFICANT CHANGE UP (ref 11.5–15.5)
HIV 1+2 AB+HIV1 P24 AG SERPL QL IA: SIGNIFICANT CHANGE UP
IANC: 3.11 K/UL — SIGNIFICANT CHANGE UP (ref 1.5–8.5)
IMM GRANULOCYTES NFR BLD AUTO: 0.1 % — SIGNIFICANT CHANGE UP (ref 0–1.5)
LYMPHOCYTES # BLD AUTO: 3 K/UL — SIGNIFICANT CHANGE UP (ref 1–3.3)
LYMPHOCYTES # BLD AUTO: 45 % — HIGH (ref 13–44)
MCHC RBC-ENTMCNC: 29 PG — SIGNIFICANT CHANGE UP (ref 27–34)
MCHC RBC-ENTMCNC: 34.1 GM/DL — SIGNIFICANT CHANGE UP (ref 32–36)
MCV RBC AUTO: 85.2 FL — SIGNIFICANT CHANGE UP (ref 80–100)
METHADONE UR-MCNC: NEGATIVE — SIGNIFICANT CHANGE UP
MONOCYTES # BLD AUTO: 0.38 K/UL — SIGNIFICANT CHANGE UP (ref 0–0.9)
MONOCYTES NFR BLD AUTO: 5.7 % — SIGNIFICANT CHANGE UP (ref 2–14)
NEUTROPHILS # BLD AUTO: 3.11 K/UL — SIGNIFICANT CHANGE UP (ref 1.8–7.4)
NEUTROPHILS NFR BLD AUTO: 46.7 % — SIGNIFICANT CHANGE UP (ref 43–77)
NRBC # BLD: 0 /100 WBCS — SIGNIFICANT CHANGE UP
NRBC # FLD: 0 K/UL — SIGNIFICANT CHANGE UP
OPIATES UR-MCNC: NEGATIVE — SIGNIFICANT CHANGE UP
OXYCODONE UR-MCNC: NEGATIVE — SIGNIFICANT CHANGE UP
PCP SPEC-MCNC: SIGNIFICANT CHANGE UP
PCP UR-MCNC: NEGATIVE — SIGNIFICANT CHANGE UP
PLATELET # BLD AUTO: 259 K/UL — SIGNIFICANT CHANGE UP (ref 150–400)
POTASSIUM SERPL-MCNC: 4.1 MMOL/L — SIGNIFICANT CHANGE UP (ref 3.5–5.3)
POTASSIUM SERPL-SCNC: 4.1 MMOL/L — SIGNIFICANT CHANGE UP (ref 3.5–5.3)
PROT SERPL-MCNC: 7.5 G/DL — SIGNIFICANT CHANGE UP (ref 6–8.3)
RBC # BLD: 5.2 M/UL — SIGNIFICANT CHANGE UP (ref 3.8–5.2)
RBC # FLD: 13.1 % — SIGNIFICANT CHANGE UP (ref 10.3–14.5)
SODIUM SERPL-SCNC: 141 MMOL/L — SIGNIFICANT CHANGE UP (ref 135–145)
T4 FREE SERPL-MCNC: 1.2 NG/DL — SIGNIFICANT CHANGE UP (ref 0.9–1.8)
THC UR QL: NEGATIVE — SIGNIFICANT CHANGE UP
TSH SERPL-MCNC: 6.34 UIU/ML — HIGH (ref 0.27–4.2)
WBC # BLD: 6.67 K/UL — SIGNIFICANT CHANGE UP (ref 3.8–10.5)
WBC # FLD AUTO: 6.67 K/UL — SIGNIFICANT CHANGE UP (ref 3.8–10.5)

## 2021-12-08 NOTE — DISCUSSION/SUMMARY
[FreeTextEntry1] : 30 yo  presents for GYN annual. \par \par 1. GYN annual\par -pap negative 2020, repeat \par -routine labs today\par -STI testing\par \par 2. Vaginal discharge\par -exam suggestive of BV, metronidazole sent to pharmacy\par -affirm, Gc/Ct collected\par \par 3. Depo\par -s/p injection 21, still with irregular bleeding\par -considering changing, will determine at visit in feb\par \par RTC as scheduled for depo

## 2021-12-08 NOTE — PHYSICAL EXAM

## 2021-12-08 NOTE — HISTORY OF PRESENT ILLNESS
[Currently Active] : currently active [Men] : men [Vaginal] : vaginal [No] : No [Yes] : Yes [Patient would like to be screened for STIs] : Patient would like to be screened for STIs [FreeTextEntry1] : 30 yo  presents for GYN annual with c/o foul smelling vaginal discharge. Since starting the depo she reports she often has thin foul smelling yellow vaginal dc. It comes and goes but will often occur before or after an instance of bleeding. Has irregular bleeding from her depo provera. She denies itching, burning, pain. \par Her son is doing well, almost 1 year old. 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 \par Last pap 20

## 2021-12-09 LAB
HBV SURFACE AG SER-ACNC: SIGNIFICANT CHANGE UP
HCV AB S/CO SERPL IA: 0.1 S/CO — SIGNIFICANT CHANGE UP (ref 0–0.99)
HCV AB SERPL-IMP: SIGNIFICANT CHANGE UP
MEV IGG SER-ACNC: >300 AU/ML — SIGNIFICANT CHANGE UP
MEV IGG+IGM SER-IMP: POSITIVE — SIGNIFICANT CHANGE UP
T PALLIDUM AB TITR SER: NEGATIVE — SIGNIFICANT CHANGE UP

## 2021-12-10 LAB
C TRACH RRNA SPEC QL NAA+PROBE: SIGNIFICANT CHANGE UP
CANDIDA AB TITR SER: SIGNIFICANT CHANGE UP
G VAGINALIS DNA SPEC QL NAA+PROBE: DETECTED
N GONORRHOEA RRNA SPEC QL NAA+PROBE: SIGNIFICANT CHANGE UP
SPECIMEN SOURCE: SIGNIFICANT CHANGE UP
T VAGINALIS SPEC QL WET PREP: SIGNIFICANT CHANGE UP

## 2021-12-11 LAB
GAMMA INTERFERON BACKGROUND BLD IA-ACNC: 0.2 IU/ML — SIGNIFICANT CHANGE UP
M TB IFN-G BLD-IMP: NEGATIVE — SIGNIFICANT CHANGE UP
M TB IFN-G CD4+ BCKGRND COR BLD-ACNC: 0.07 IU/ML — SIGNIFICANT CHANGE UP
M TB IFN-G CD4+CD8+ BCKGRND COR BLD-ACNC: -0.07 IU/ML — SIGNIFICANT CHANGE UP
QUANT TB PLUS MITOGEN MINUS NIL: 6.59 IU/ML — SIGNIFICANT CHANGE UP

## 2021-12-14 ENCOUNTER — NON-APPOINTMENT (OUTPATIENT)
Age: 31
End: 2021-12-14

## 2021-12-22 DIAGNOSIS — N89.8 OTHER SPECIFIED NONINFLAMMATORY DISORDERS OF VAGINA: ICD-10-CM

## 2021-12-22 DIAGNOSIS — Z01.419 ENCOUNTER FOR GYNECOLOGICAL EXAMINATION (GENERAL) (ROUTINE) WITHOUT ABNORMAL FINDINGS: ICD-10-CM

## 2022-02-01 ENCOUNTER — APPOINTMENT (OUTPATIENT)
Dept: OBGYN | Facility: HOSPITAL | Age: 32
End: 2022-02-01

## 2022-02-24 ENCOUNTER — APPOINTMENT (OUTPATIENT)
Dept: OBGYN | Facility: HOSPITAL | Age: 32
End: 2022-02-24

## 2022-03-02 ENCOUNTER — RESULT CHARGE (OUTPATIENT)
Age: 32
End: 2022-03-02

## 2022-03-02 ENCOUNTER — MED ADMIN CHARGE (OUTPATIENT)
Age: 32
End: 2022-03-02

## 2022-03-02 ENCOUNTER — APPOINTMENT (OUTPATIENT)
Dept: OBGYN | Facility: HOSPITAL | Age: 32
End: 2022-03-02

## 2022-03-02 ENCOUNTER — OUTPATIENT (OUTPATIENT)
Dept: OUTPATIENT SERVICES | Facility: HOSPITAL | Age: 32
LOS: 1 days | End: 2022-03-02

## 2022-03-02 VITALS
SYSTOLIC BLOOD PRESSURE: 93 MMHG | WEIGHT: 130 LBS | TEMPERATURE: 98 F | BODY MASS INDEX: 23.04 KG/M2 | HEIGHT: 63 IN | DIASTOLIC BLOOD PRESSURE: 52 MMHG | HEART RATE: 74 BPM

## 2022-03-02 LAB
HCG UR QL: NEGATIVE
QUALITY CONTROL: YES

## 2022-03-02 RX ORDER — MEDROXYPROGESTERONE ACETATE 150 MG/ML
150 INJECTION, SUSPENSION INTRAMUSCULAR
Qty: 0 | Refills: 0 | Status: COMPLETED | OUTPATIENT
Start: 2022-03-02

## 2022-03-02 RX ADMIN — MEDROXYPROGESTERONE ACETATE 0 MG/ML: 150 INJECTION, SUSPENSION INTRAMUSCULAR at 00:00

## 2022-03-02 NOTE — PLAN
[FreeTextEntry1] : Patient has been on Depo since 1/2021.  She is two weeks late for Depo this cycle.  Patient denies unprotected sex in that time and would like to have injection today.  She plans on continuing with method for 6-12 months and then plans to try to conceive.\par Risks and instructions reviewed.

## 2022-03-03 DIAGNOSIS — Z30.42 ENCOUNTER FOR SURVEILLANCE OF INJECTABLE CONTRACEPTIVE: ICD-10-CM

## 2022-04-15 NOTE — OB PROVIDER DELIVERY SUMMARY - NSPROVIDERDELIVERYNOTE_OBGYN_ALL_OB_FT
Spouse states that they have received the c pap full mask, she is asking can we please track it or see what the hold up is?  Please advise    viable male infant, apgars 8/9, wt 4430g, GIFTY/cephalic  hysterotomy with right sided extension closed in single layer with caprosyn  grossly normal uterus, tubes and ovaries b/l  rectus muscles reapproximated with vicryl, interrupted  fascia closed with vicryl, subQ closed with vicryl, skin closed in subcuticular fashion with monocryl  508/1500/300

## 2022-05-18 ENCOUNTER — OUTPATIENT (OUTPATIENT)
Dept: OUTPATIENT SERVICES | Facility: HOSPITAL | Age: 32
LOS: 1 days | End: 2022-05-18

## 2022-05-18 ENCOUNTER — APPOINTMENT (OUTPATIENT)
Dept: OBGYN | Facility: HOSPITAL | Age: 32
End: 2022-05-18

## 2022-05-18 VITALS
HEART RATE: 68 BPM | TEMPERATURE: 98.2 F | HEIGHT: 63 IN | WEIGHT: 126 LBS | SYSTOLIC BLOOD PRESSURE: 107 MMHG | BODY MASS INDEX: 22.32 KG/M2 | DIASTOLIC BLOOD PRESSURE: 56 MMHG

## 2022-05-18 RX ORDER — MEDROXYPROGESTERONE ACETATE 150 MG/ML
150 INJECTION, SUSPENSION INTRAMUSCULAR
Qty: 0 | Refills: 0 | Status: COMPLETED | OUTPATIENT
Start: 2022-05-18

## 2022-05-18 RX ADMIN — MEDROXYPROGESTERONE ACETATE 0 MG/ML: 150 INJECTION, SUSPENSION INTRAMUSCULAR at 00:00

## 2022-05-18 NOTE — DISCUSSION/SUMMARY
[FreeTextEntry1] : 32 yo presents for contraceptive followup. \par \par 1. Contraception\par -counseled irregular bleeding may be due to being late for last depo, may regulate with new injection however reviewed all available contraception alternatives. Pt wishes to continue with depo for now and see if bleeding stops, however may consider vaginal ring in the future should bleeding continue\par -BV sx likely due to persistent bleeding, metrogel sent to pharmacy\par -to return for depo at 10 weeks if she does decide to continue with depo\par \par RTC 7/27

## 2022-05-18 NOTE — HISTORY OF PRESENT ILLNESS
[FreeTextEntry1] : 32 yo presents for contraceptive followup. Has been on depo, was late for last injection and got depo 2 weeks after window. She has been having bleeding on and off that has been bothersome for her. She is considering switching methods. She thinks they will want to have another child in 1-2 years. \par She also complains of a foul smelling vaginal odor since she starting having the persistent bleeding.

## 2022-05-19 DIAGNOSIS — N89.8 OTHER SPECIFIED NONINFLAMMATORY DISORDERS OF VAGINA: ICD-10-CM

## 2022-05-19 DIAGNOSIS — Z30.42 ENCOUNTER FOR SURVEILLANCE OF INJECTABLE CONTRACEPTIVE: ICD-10-CM

## 2022-07-27 ENCOUNTER — APPOINTMENT (OUTPATIENT)
Dept: OBGYN | Facility: HOSPITAL | Age: 32
End: 2022-07-27

## 2022-07-28 ENCOUNTER — APPOINTMENT (OUTPATIENT)
Dept: OBGYN | Facility: HOSPITAL | Age: 32
End: 2022-07-28

## 2022-08-01 NOTE — OB PROVIDER TRIAGE NOTE - NS_FETALMONCTXRES_OBGYN_ALL_OB
Relaxed Topical Ketoconazole Pregnancy And Lactation Text: This medication is Pregnancy Category B and is considered safe during pregnancy. It is unknown if it is excreted in breast milk.

## 2022-08-03 ENCOUNTER — OUTPATIENT (OUTPATIENT)
Dept: OUTPATIENT SERVICES | Facility: HOSPITAL | Age: 32
LOS: 1 days | End: 2022-08-03

## 2022-08-03 ENCOUNTER — APPOINTMENT (OUTPATIENT)
Dept: OBGYN | Facility: HOSPITAL | Age: 32
End: 2022-08-03

## 2022-08-03 VITALS
HEIGHT: 63 IN | WEIGHT: 121 LBS | DIASTOLIC BLOOD PRESSURE: 62 MMHG | SYSTOLIC BLOOD PRESSURE: 108 MMHG | HEART RATE: 73 BPM | TEMPERATURE: 98 F | BODY MASS INDEX: 21.44 KG/M2

## 2022-08-03 RX ORDER — MEDROXYPROGESTERONE ACETATE 150 MG/ML
150 INJECTION, SUSPENSION INTRAMUSCULAR
Qty: 0 | Refills: 0 | Status: COMPLETED | OUTPATIENT
Start: 2022-08-03

## 2022-08-04 DIAGNOSIS — Z30.42 ENCOUNTER FOR SURVEILLANCE OF INJECTABLE CONTRACEPTIVE: ICD-10-CM

## 2022-10-27 ENCOUNTER — APPOINTMENT (OUTPATIENT)
Dept: OBGYN | Facility: HOSPITAL | Age: 32
End: 2022-10-27

## 2022-11-17 ENCOUNTER — APPOINTMENT (OUTPATIENT)
Dept: OBGYN | Facility: HOSPITAL | Age: 32
End: 2022-11-17

## 2022-11-17 ENCOUNTER — OUTPATIENT (OUTPATIENT)
Dept: OUTPATIENT SERVICES | Facility: HOSPITAL | Age: 32
LOS: 1 days | End: 2022-11-17

## 2022-11-17 VITALS
DIASTOLIC BLOOD PRESSURE: 56 MMHG | HEIGHT: 63 IN | BODY MASS INDEX: 21.97 KG/M2 | SYSTOLIC BLOOD PRESSURE: 100 MMHG | WEIGHT: 124 LBS | HEART RATE: 73 BPM | TEMPERATURE: 97.9 F

## 2022-11-17 DIAGNOSIS — Z30.42 ENCOUNTER FOR SURVEILLANCE OF INJECTABLE CONTRACEPTIVE: ICD-10-CM

## 2022-11-17 DIAGNOSIS — Z00.00 ENCOUNTER FOR GENERAL ADULT MEDICAL EXAMINATION WITHOUT ABNORMAL FINDINGS: ICD-10-CM

## 2022-11-17 LAB
HCG UR QL: NEGATIVE
QUALITY CONTROL: YES

## 2022-11-17 RX ORDER — MEDROXYPROGESTERONE ACETATE 150 MG/ML
150 INJECTION, SUSPENSION INTRAMUSCULAR
Qty: 0 | Refills: 0 | Status: COMPLETED | OUTPATIENT
Start: 2022-11-17

## 2022-11-17 RX ADMIN — MEDROXYPROGESTERONE ACETATE 0 MG/ML: 150 INJECTION, SUSPENSION INTRAMUSCULAR at 00:00

## 2022-11-17 NOTE — HISTORY OF PRESENT ILLNESS
[FreeTextEntry1] : 33 yo  here for Depo Provera.  Is late for injection today.  Urine pregnancy test is negative.

## 2022-11-17 NOTE — DISCUSSION/SUMMARY
[FreeTextEntry1] : Depo Provera given\par Calcium supplements advised\par Condoms for safe sex\par Fpllow up 2/2-2/16/2023 for next Depo

## 2023-01-01 NOTE — DISCHARGE NOTE OB - HOSPITAL COURSE
Patient presented for LTIOL and underwent an uncomplicated primary LTCS for cat II tracing in the setting of chorioamnionitis. , H/H 35.9->40.0. Patient’s postoperative course was significant for facial swelling after taking ampicillin, ancef, azithromycin, and invanz. No laryngeal swelling. Symptoms resolved with benadryl. She remained hemodynamically stable and afebrile throughout for the remainder of her postoperative course. Upon discharge on POD2, the patient is ambulating and voiding spontaneously, tolerating oral intake, pain was well controlled with oral medication, and vital signs were stable. yes

## 2023-02-07 ENCOUNTER — OUTPATIENT (OUTPATIENT)
Dept: OUTPATIENT SERVICES | Facility: HOSPITAL | Age: 33
LOS: 1 days | End: 2023-02-07

## 2023-02-07 ENCOUNTER — APPOINTMENT (OUTPATIENT)
Dept: OBGYN | Facility: HOSPITAL | Age: 33
End: 2023-02-07

## 2023-02-07 ENCOUNTER — NON-APPOINTMENT (OUTPATIENT)
Age: 33
End: 2023-02-07

## 2023-02-07 VITALS
WEIGHT: 127 LBS | SYSTOLIC BLOOD PRESSURE: 100 MMHG | HEIGHT: 63 IN | BODY MASS INDEX: 22.5 KG/M2 | TEMPERATURE: 97.2 F | HEART RATE: 77 BPM | DIASTOLIC BLOOD PRESSURE: 55 MMHG

## 2023-02-07 DIAGNOSIS — N89.8 OTHER SPECIFIED NONINFLAMMATORY DISORDERS OF VAGINA: ICD-10-CM

## 2023-02-07 DIAGNOSIS — Z92.29 PERSONAL HISTORY OF OTHER DRUG THERAPY: ICD-10-CM

## 2023-02-07 DIAGNOSIS — Z30.013 ENCOUNTER FOR INITIAL PRESCRIPTION OF INJECTABLE CONTRACEPTIVE: ICD-10-CM

## 2023-02-07 DIAGNOSIS — M25.449 EFFUSION, UNSPECIFIED HAND: ICD-10-CM

## 2023-02-07 DIAGNOSIS — Z87.19 PERSONAL HISTORY OF OTHER DISEASES OF THE DIGESTIVE SYSTEM: ICD-10-CM

## 2023-02-07 RX ORDER — MEDROXYPROGESTERONE ACETATE 150 MG/ML
150 INJECTION, SUSPENSION INTRAMUSCULAR
Qty: 0 | Refills: 0 | Status: COMPLETED | OUTPATIENT
Start: 2023-02-07

## 2023-02-08 DIAGNOSIS — Z30.42 ENCOUNTER FOR SURVEILLANCE OF INJECTABLE CONTRACEPTIVE: ICD-10-CM

## 2023-03-03 ENCOUNTER — LABORATORY RESULT (OUTPATIENT)
Age: 33
End: 2023-03-03

## 2023-03-03 ENCOUNTER — APPOINTMENT (OUTPATIENT)
Dept: INTERNAL MEDICINE | Facility: CLINIC | Age: 33
End: 2023-03-03
Payer: MEDICAID

## 2023-03-03 ENCOUNTER — OUTPATIENT (OUTPATIENT)
Dept: OUTPATIENT SERVICES | Facility: HOSPITAL | Age: 33
LOS: 1 days | End: 2023-03-03

## 2023-03-03 VITALS
WEIGHT: 127 LBS | DIASTOLIC BLOOD PRESSURE: 80 MMHG | HEART RATE: 69 BPM | BODY MASS INDEX: 22.5 KG/M2 | OXYGEN SATURATION: 99 % | HEIGHT: 63 IN | SYSTOLIC BLOOD PRESSURE: 108 MMHG

## 2023-03-03 DIAGNOSIS — Z23 ENCOUNTER FOR IMMUNIZATION: ICD-10-CM

## 2023-03-03 DIAGNOSIS — G89.29 PAIN IN RIGHT KNEE: ICD-10-CM

## 2023-03-03 DIAGNOSIS — M25.562 PAIN IN RIGHT KNEE: ICD-10-CM

## 2023-03-03 DIAGNOSIS — M54.50 LOW BACK PAIN, UNSPECIFIED: ICD-10-CM

## 2023-03-03 DIAGNOSIS — M25.561 PAIN IN RIGHT KNEE: ICD-10-CM

## 2023-03-03 DIAGNOSIS — Z80.6 FAMILY HISTORY OF LEUKEMIA: ICD-10-CM

## 2023-03-03 DIAGNOSIS — Z00.00 ENCOUNTER FOR GENERAL ADULT MEDICAL EXAMINATION W/OUT ABNORMAL FINDINGS: ICD-10-CM

## 2023-03-03 DIAGNOSIS — R94.6 ABNORMAL RESULTS OF THYROID FUNCTION STUDIES: ICD-10-CM

## 2023-03-03 PROCEDURE — 99204 OFFICE O/P NEW MOD 45 MIN: CPT | Mod: GC

## 2023-03-03 RX ORDER — METRONIDAZOLE 7.5 MG/G
0.75 GEL VAGINAL
Qty: 1 | Refills: 1 | Status: DISCONTINUED | COMMUNITY
Start: 2020-12-01 | End: 2023-03-03

## 2023-03-03 RX ORDER — LIDOCAINE 40 MG/G
4 PATCH TOPICAL
Qty: 10 | Refills: 0 | Status: ACTIVE | COMMUNITY
Start: 2023-03-03 | End: 1900-01-01

## 2023-03-03 RX ORDER — MAGNESIUM HYDROXIDE 400 MG/5ML
1200 SUSPENSION, ORAL (FINAL DOSE FORM) ORAL
Qty: 1 | Refills: 0 | Status: DISCONTINUED | COMMUNITY
Start: 2020-12-01 | End: 2023-03-03

## 2023-03-03 RX ORDER — METRONIDAZOLE 500 MG/1
500 TABLET ORAL TWICE DAILY
Qty: 14 | Refills: 1 | Status: DISCONTINUED | COMMUNITY
Start: 2021-12-08 | End: 2023-03-03

## 2023-03-06 ENCOUNTER — NON-APPOINTMENT (OUTPATIENT)
Age: 33
End: 2023-03-06

## 2023-03-07 DIAGNOSIS — R94.6 ABNORMAL RESULTS OF THYROID FUNCTION STUDIES: ICD-10-CM

## 2023-03-07 DIAGNOSIS — M54.50 LOW BACK PAIN, UNSPECIFIED: ICD-10-CM

## 2023-03-07 DIAGNOSIS — Z80.6 FAMILY HISTORY OF LEUKEMIA: ICD-10-CM

## 2023-03-07 DIAGNOSIS — M25.561 PAIN IN RIGHT KNEE: ICD-10-CM

## 2023-03-07 DIAGNOSIS — Z00.00 ENCOUNTER FOR GENERAL ADULT MEDICAL EXAMINATION WITHOUT ABNORMAL FINDINGS: ICD-10-CM

## 2023-03-07 NOTE — INTERPRETER SERVICES
[[employer] ] : provided by [employer]   [Time Spent: ____ minutes] : Total time spent using  services: [unfilled] minutes. The patient's primary language is not English thus required  services. [Interpreters_IDNumber] : [id number] [Interpreters_FullName] : [place]  [TWNoteComboBox1] : [place]

## 2023-03-07 NOTE — REVIEW OF SYSTEMS
[Fever] : no fever [Chills] : no chills [Fatigue] : no fatigue [Night Sweats] : no night sweats [Discharge] : no discharge [Vision Problems] : no vision problems [Earache] : no earache [Hearing Loss] : no hearing loss [Chest Pain] : no chest pain [Palpitations] : no palpitations [Leg Claudication] : no leg claudication [Lower Ext Edema] : no lower extremity edema [Orthopnea] : no orthopnea [Paroxysmal Nocturnal Dyspnea] : no paroxysmal nocturnal dyspnea [Shortness Of Breath] : no shortness of breath [Wheezing] : no wheezing [Cough] : no cough [Dyspnea on Exertion] : no dyspnea on exertion [Abdominal Pain] : no abdominal pain [Nausea] : no nausea [Constipation] : no constipation [Vomiting] : no vomiting [Dysuria] : no dysuria [Incontinence] : no incontinence [Hematuria] : no hematuria [Frequency] : no frequency [Itching] : no itching [Skin Rash] : no skin rash [Headache] : no headache [Dizziness] : no dizziness [Fainting] : no fainting [Memory Loss] : no memory loss [Easy Bleeding] : no easy bleeding [Easy Bruising] : no easy bruising [FreeTextEntry9] : + B/L Knee pain, lumbar pain
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
## 2023-03-07 NOTE — PHYSICAL EXAM
[No Acute Distress] : no acute distress [Well Nourished] : well nourished [Well Developed] : well developed [Well-Appearing] : well-appearing [Normal Sclera/Conjunctiva] : normal sclera/conjunctiva [EOMI] : extraocular movements intact [No JVD] : no jugular venous distention [No Lymphadenopathy] : no lymphadenopathy [Supple] : supple [No Respiratory Distress] : no respiratory distress  [No Accessory Muscle Use] : no accessory muscle use [Clear to Auscultation] : lungs were clear to auscultation bilaterally [Normal Rate] : normal rate  [Regular Rhythm] : with a regular rhythm [Normal S1, S2] : normal S1 and S2 [Soft] : abdomen soft [Non Tender] : non-tender [Non-distended] : non-distended [No HSM] : no HSM [Normal Bowel Sounds] : normal bowel sounds [Normal Posterior Cervical Nodes] : no posterior cervical lymphadenopathy [Normal Anterior Cervical Nodes] : no anterior cervical lymphadenopathy [No CVA Tenderness] : no CVA  tenderness [Grossly Normal Strength/Tone] : grossly normal strength/tone [Coordination Grossly Intact] : coordination grossly intact [No Focal Deficits] : no focal deficits [Normal Gait] : normal gait [Normal Affect] : the affect was normal [Normal Insight/Judgement] : insight and judgment were intact [de-identified] : + paraspinal tenderness on palpation, no vertebral step offs, lateral knee pain on palpation, ROM intact throughout

## 2023-03-07 NOTE — ASSESSMENT
[FreeTextEntry1] : 33 Y/O F with no PMHx presenting to establish care \par \par #HCM:\par COVID: received 2 shots\par Flu: ordered today \par Labs: ordered today \par Tdap: 2020 \par \par RTC in 6 months with me or earlier if needed\par \par Sarah Valerio, PGY-2\par [employer] \par

## 2023-03-08 LAB
ALBUMIN SERPL ELPH-MCNC: 4.9 G/DL
ALP BLD-CCNC: 89 U/L
ALT SERPL-CCNC: 11 U/L
ANION GAP SERPL CALC-SCNC: 14 MMOL/L
AST SERPL-CCNC: 16 U/L
BASOPHILS # BLD AUTO: 0.02 K/UL
BASOPHILS NFR BLD AUTO: 0.3 %
BILIRUB SERPL-MCNC: 0.8 MG/DL
BUN SERPL-MCNC: 14 MG/DL
CALCIUM SERPL-MCNC: 9.9 MG/DL
CHLORIDE SERPL-SCNC: 106 MMOL/L
CHOLEST SERPL-MCNC: 198 MG/DL
CO2 SERPL-SCNC: 20 MMOL/L
CREAT SERPL-MCNC: 0.77 MG/DL
EGFR: 104 ML/MIN/1.73M2
EOSINOPHIL # BLD AUTO: 0.13 K/UL
EOSINOPHIL NFR BLD AUTO: 2.2 %
ESTIMATED AVERAGE GLUCOSE: 105 MG/DL
GLUCOSE SERPL-MCNC: 95 MG/DL
HBA1C MFR BLD HPLC: 5.3 %
HCT VFR BLD CALC: 43.6 %
HDLC SERPL-MCNC: 62 MG/DL
HGB BLD-MCNC: 14.2 G/DL
IMM GRANULOCYTES NFR BLD AUTO: 0.2 %
LDLC SERPL CALC-MCNC: 125 MG/DL
LYMPHOCYTES # BLD AUTO: 2.59 K/UL
LYMPHOCYTES NFR BLD AUTO: 43 %
MAN DIFF?: NORMAL
MCHC RBC-ENTMCNC: 28.3 PG
MCHC RBC-ENTMCNC: 32.6 GM/DL
MCV RBC AUTO: 87 FL
MONOCYTES # BLD AUTO: 0.37 K/UL
MONOCYTES NFR BLD AUTO: 6.1 %
NEUTROPHILS # BLD AUTO: 2.9 K/UL
NEUTROPHILS NFR BLD AUTO: 48.2 %
NONHDLC SERPL-MCNC: 136 MG/DL
PLATELET # BLD AUTO: 256 K/UL
POTASSIUM SERPL-SCNC: 4.6 MMOL/L
PROT SERPL-MCNC: 7.2 G/DL
RBC # BLD: 5.01 M/UL
RBC # FLD: 13.1 %
SODIUM SERPL-SCNC: 140 MMOL/L
TRIGL SERPL-MCNC: 58 MG/DL
TSH SERPL-ACNC: 5.75 UIU/ML
WBC # FLD AUTO: 6.02 K/UL

## 2023-04-25 ENCOUNTER — RESULT REVIEW (OUTPATIENT)
Age: 33
End: 2023-04-25

## 2023-04-25 ENCOUNTER — OUTPATIENT (OUTPATIENT)
Dept: OUTPATIENT SERVICES | Facility: HOSPITAL | Age: 33
LOS: 1 days | End: 2023-04-25

## 2023-04-25 ENCOUNTER — APPOINTMENT (OUTPATIENT)
Dept: OBGYN | Facility: HOSPITAL | Age: 33
End: 2023-04-25

## 2023-04-25 VITALS
SYSTOLIC BLOOD PRESSURE: 113 MMHG | BODY MASS INDEX: 23.04 KG/M2 | DIASTOLIC BLOOD PRESSURE: 52 MMHG | HEART RATE: 76 BPM | WEIGHT: 130 LBS | HEIGHT: 63 IN | TEMPERATURE: 97.2 F

## 2023-04-25 DIAGNOSIS — R10.2 PELVIC AND PERINEAL PAIN: ICD-10-CM

## 2023-04-25 DIAGNOSIS — N76.0 ACUTE VAGINITIS: ICD-10-CM

## 2023-04-25 DIAGNOSIS — Z30.42 ENCOUNTER FOR SURVEILLANCE OF INJECTABLE CONTRACEPTIVE: ICD-10-CM

## 2023-04-25 DIAGNOSIS — Z01.419 ENCOUNTER FOR GYNECOLOGICAL EXAMINATION (GENERAL) (ROUTINE) W/OUT ABNORMAL FINDINGS: ICD-10-CM

## 2023-04-25 DIAGNOSIS — B96.89 ACUTE VAGINITIS: ICD-10-CM

## 2023-04-25 LAB
A1C WITH ESTIMATED AVERAGE GLUCOSE RESULT: 5.4 % — SIGNIFICANT CHANGE UP (ref 4–5.6)
ALBUMIN SERPL ELPH-MCNC: 5 G/DL — SIGNIFICANT CHANGE UP (ref 3.3–5)
ALP SERPL-CCNC: 97 U/L — SIGNIFICANT CHANGE UP (ref 40–120)
ALT FLD-CCNC: 9 U/L — SIGNIFICANT CHANGE UP (ref 4–33)
ANION GAP SERPL CALC-SCNC: 13 MMOL/L — SIGNIFICANT CHANGE UP (ref 7–14)
AST SERPL-CCNC: 18 U/L — SIGNIFICANT CHANGE UP (ref 4–32)
BASOPHILS # BLD AUTO: 0.02 K/UL — SIGNIFICANT CHANGE UP (ref 0–0.2)
BASOPHILS NFR BLD AUTO: 0.3 % — SIGNIFICANT CHANGE UP (ref 0–2)
BILIRUB SERPL-MCNC: 1.1 MG/DL — SIGNIFICANT CHANGE UP (ref 0.2–1.2)
BUN SERPL-MCNC: 14 MG/DL — SIGNIFICANT CHANGE UP (ref 7–23)
CALCIUM SERPL-MCNC: 9.6 MG/DL — SIGNIFICANT CHANGE UP (ref 8.4–10.5)
CHLORIDE SERPL-SCNC: 100 MMOL/L — SIGNIFICANT CHANGE UP (ref 98–107)
CO2 SERPL-SCNC: 24 MMOL/L — SIGNIFICANT CHANGE UP (ref 22–31)
CREAT SERPL-MCNC: 0.69 MG/DL — SIGNIFICANT CHANGE UP (ref 0.5–1.3)
EGFR: 117 ML/MIN/1.73M2 — SIGNIFICANT CHANGE UP
EOSINOPHIL # BLD AUTO: 0.13 K/UL — SIGNIFICANT CHANGE UP (ref 0–0.5)
EOSINOPHIL NFR BLD AUTO: 1.8 % — SIGNIFICANT CHANGE UP (ref 0–6)
ESTIMATED AVERAGE GLUCOSE: 108 — SIGNIFICANT CHANGE UP
GLUCOSE SERPL-MCNC: 101 MG/DL — HIGH (ref 70–99)
HCT VFR BLD CALC: 41.1 % — SIGNIFICANT CHANGE UP (ref 34.5–45)
HGB BLD-MCNC: 13.7 G/DL — SIGNIFICANT CHANGE UP (ref 11.5–15.5)
HIV 1+2 AB+HIV1 P24 AG SERPL QL IA: SIGNIFICANT CHANGE UP
IANC: 3.55 K/UL — SIGNIFICANT CHANGE UP (ref 1.8–7.4)
IMM GRANULOCYTES NFR BLD AUTO: 0.4 % — SIGNIFICANT CHANGE UP (ref 0–0.9)
LYMPHOCYTES # BLD AUTO: 3.1 K/UL — SIGNIFICANT CHANGE UP (ref 1–3.3)
LYMPHOCYTES # BLD AUTO: 42.3 % — SIGNIFICANT CHANGE UP (ref 13–44)
MCHC RBC-ENTMCNC: 28.4 PG — SIGNIFICANT CHANGE UP (ref 27–34)
MCHC RBC-ENTMCNC: 33.3 GM/DL — SIGNIFICANT CHANGE UP (ref 32–36)
MCV RBC AUTO: 85.3 FL — SIGNIFICANT CHANGE UP (ref 80–100)
MONOCYTES # BLD AUTO: 0.5 K/UL — SIGNIFICANT CHANGE UP (ref 0–0.9)
MONOCYTES NFR BLD AUTO: 6.8 % — SIGNIFICANT CHANGE UP (ref 2–14)
NEUTROPHILS # BLD AUTO: 3.55 K/UL — SIGNIFICANT CHANGE UP (ref 1.8–7.4)
NEUTROPHILS NFR BLD AUTO: 48.4 % — SIGNIFICANT CHANGE UP (ref 43–77)
NRBC # BLD: 0 /100 WBCS — SIGNIFICANT CHANGE UP (ref 0–0)
NRBC # FLD: 0 K/UL — SIGNIFICANT CHANGE UP (ref 0–0)
PLATELET # BLD AUTO: 264 K/UL — SIGNIFICANT CHANGE UP (ref 150–400)
POTASSIUM SERPL-MCNC: 3.8 MMOL/L — SIGNIFICANT CHANGE UP (ref 3.5–5.3)
POTASSIUM SERPL-SCNC: 3.8 MMOL/L — SIGNIFICANT CHANGE UP (ref 3.5–5.3)
PROT SERPL-MCNC: 7 G/DL — SIGNIFICANT CHANGE UP (ref 6–8.3)
RBC # BLD: 4.82 M/UL — SIGNIFICANT CHANGE UP (ref 3.8–5.2)
RBC # FLD: 12.9 % — SIGNIFICANT CHANGE UP (ref 10.3–14.5)
SODIUM SERPL-SCNC: 137 MMOL/L — SIGNIFICANT CHANGE UP (ref 135–145)
TSH SERPL-MCNC: 5.27 UIU/ML — HIGH (ref 0.27–4.2)
WBC # BLD: 7.33 K/UL — SIGNIFICANT CHANGE UP (ref 3.8–10.5)
WBC # FLD AUTO: 7.33 K/UL — SIGNIFICANT CHANGE UP (ref 3.8–10.5)

## 2023-04-25 RX ORDER — CLINDAMYCIN PHOSPHATE 20 MG/G
2 CREAM VAGINAL
Qty: 1 | Refills: 1 | Status: ACTIVE | COMMUNITY
Start: 2023-04-25 | End: 1900-01-01

## 2023-04-25 RX ORDER — MEDROXYPROGESTERONE ACETATE 150 MG/ML
150 INJECTION, SUSPENSION INTRAMUSCULAR
Qty: 0 | Refills: 0 | Status: COMPLETED | OUTPATIENT
Start: 2023-04-25

## 2023-04-25 RX ORDER — PNV NO.95/FERROUS FUM/FOLIC AC 28MG-0.8MG
500-3.125 TABLET ORAL DAILY
Qty: 90 | Refills: 3 | Status: ACTIVE | COMMUNITY
Start: 2023-04-25 | End: 1900-01-01

## 2023-04-25 RX ORDER — FLUCONAZOLE 150 MG/1
150 TABLET ORAL DAILY
Qty: 1 | Refills: 1 | Status: ACTIVE | COMMUNITY
Start: 2023-04-25 | End: 1900-01-01

## 2023-04-25 RX ADMIN — MEDROXYPROGESTERONE ACETATE 0 MG/ML: 150 INJECTION, SUSPENSION INTRAMUSCULAR at 00:00

## 2023-04-25 NOTE — COUNSELING
[Nutrition/ Exercise/ Weight Management] : nutrition, exercise, weight management [Vitamins/Supplements] : vitamins/supplements [Breast Self Exam] : breast self exam [Contraception/ Emergency Contraception/ Safe Sexual Practices] : contraception, emergency contraception, safe sexual practices [STD (testing, results, tx)] : STD (testing, results, tx) [HPV Vaccine] : HPV Vaccine [Lab Results] : lab results

## 2023-04-25 NOTE — HISTORY OF PRESENT ILLNESS
[Patient reported PAP Smear was normal] : Patient reported PAP Smear was normal [HIV Test offered] : HIV Test offered [Syphilis test offered] : Syphilis test offered [Gonorrhea test offered] : Gonorrhea test offered [Chlamydia test offered] : Chlamydia test offered [HPV test offered] : HPV test offered [Hepatitis B test offered] : Hepatitis B test offered [Hepatitis C test offered] : Hepatitis C test offered [N] : Patient reports normal menses [DepoProvera] : uses depo-medroxyprogesterone [Y] : Positive pregnancy history [TextBox_4] : 31 yo  here for annual Gyn exam and repeat Depo Provera.  Happy with Depo and wants to continue use.  c/o LLQ pelvic pain and vaginal discharge with odor.  Mrried, monogamous with .   Asking about HPV vaccine. [PapSmeardate] : 06/20 [PGxTotal] : 1 [[facility]] : 1 [PGHxPremature] : 0 [PGHxAbortions] : 0 [[facility] Center[facility]] : 1 [Currently Active] : currently active [Men] : men [Vaginal] : vaginal [No] : No [Yes] : Yes [Patient would like to be screened for STIs] : Patient would like to be screened for STIs

## 2023-04-25 NOTE — REASON FOR VISIT
[Annual] : an annual visit. [Pelvic Pain] : pelvic pain [Vulvar/Vaginal Complaint] : vulvar/vaginal complaint

## 2023-04-25 NOTE — PHYSICAL EXAM
[Chaperone Present] : A chaperone was present in the examining room during all aspects of the physical examination [Appropriately responsive] : appropriately responsive [Alert] : alert [No Acute Distress] : no acute distress [No Lymphadenopathy] : no lymphadenopathy [Soft] : soft [Non-tender] : non-tender [Non-distended] : non-distended [No HSM] : No HSM [No Lesions] : no lesions [No Mass] : no mass [Oriented x3] : oriented x3 [FreeTextEntry7] : low transverse scar from abdominoplasty [Examination Of The Breasts] : a normal appearance [] : implants [No Masses] : no breast masses were palpable [Labia Majora] : normal [Labia Minora] : normal [Discharge] : a  ~M vaginal discharge was present [Heavy] : heavy [White] : white [Thin] : thin [Normal] : normal [Uterine Adnexae] : normal

## 2023-04-25 NOTE — DISCUSSION/SUMMARY
[FreeTextEntry1] : Annual Gyn exam\par --Pap, HPV, GC/Chlam done\par --annual blood work including STD blood work done\par --SBE monthly\par --diet and exercise\par \par Pelvic Pain\par --no masses palpable in left adnexa\par --pelvic sono referral given\par \par Bacterial Vaginosis\par --discharge and vaginal odor c/w BV\par --Affirm done\par --Rx Clindamycin cream PV QHS\par --Rx Fluconazole PO once\par --vag care, diet and probiotics advised\par \par HPV vaccine\par --Gardasil injection #1 today\par --Follow up in 2 months for next Gardasil\par \par Depo Provera\par --Depo Provera given today\par --calcium supplements advised and Rx sent to pharmacy\par --safe sex and condoms\par Follow up for next Depo July 11-25, 2023

## 2023-04-26 DIAGNOSIS — R10.2 PELVIC AND PERINEAL PAIN: ICD-10-CM

## 2023-04-26 DIAGNOSIS — N76.0 ACUTE VAGINITIS: ICD-10-CM

## 2023-04-26 DIAGNOSIS — Z30.42 ENCOUNTER FOR SURVEILLANCE OF INJECTABLE CONTRACEPTIVE: ICD-10-CM

## 2023-04-26 DIAGNOSIS — Z01.419 ENCOUNTER FOR GYNECOLOGICAL EXAMINATION (GENERAL) (ROUTINE) WITHOUT ABNORMAL FINDINGS: ICD-10-CM

## 2023-04-26 DIAGNOSIS — Z23 ENCOUNTER FOR IMMUNIZATION: ICD-10-CM

## 2023-04-26 LAB
C TRACH RRNA SPEC QL NAA+PROBE: SIGNIFICANT CHANGE UP
CANDIDA AB TITR SER: SIGNIFICANT CHANGE UP
G VAGINALIS DNA SPEC QL NAA+PROBE: DETECTED
HPV HIGH+LOW RISK DNA PNL CVX: SIGNIFICANT CHANGE UP
N GONORRHOEA RRNA SPEC QL NAA+PROBE: SIGNIFICANT CHANGE UP
SPECIMEN SOURCE: SIGNIFICANT CHANGE UP
T PALLIDUM AB TITR SER: NEGATIVE — SIGNIFICANT CHANGE UP
T VAGINALIS SPEC QL WET PREP: SIGNIFICANT CHANGE UP

## 2023-04-29 LAB — CYTOLOGY SPEC DOC CYTO: SIGNIFICANT CHANGE UP

## 2023-06-20 ENCOUNTER — APPOINTMENT (OUTPATIENT)
Dept: OBGYN | Facility: HOSPITAL | Age: 33
End: 2023-06-20

## 2023-06-20 ENCOUNTER — OUTPATIENT (OUTPATIENT)
Dept: OUTPATIENT SERVICES | Facility: HOSPITAL | Age: 33
LOS: 1 days | End: 2023-06-20

## 2023-06-20 VITALS
HEART RATE: 66 BPM | DIASTOLIC BLOOD PRESSURE: 59 MMHG | WEIGHT: 127 LBS | BODY MASS INDEX: 22.5 KG/M2 | TEMPERATURE: 97.8 F | SYSTOLIC BLOOD PRESSURE: 110 MMHG | HEIGHT: 63 IN

## 2023-06-20 DIAGNOSIS — Z23 ENCOUNTER FOR IMMUNIZATION: ICD-10-CM

## 2023-06-20 DIAGNOSIS — Z71.85 ENCOUNTER FOR IMMUNIZATION SAFETY COUNSELING: ICD-10-CM

## 2023-06-20 NOTE — COUNSELING
[Contraception/ Emergency Contraception/ Safe Sexual Practices] : contraception, emergency contraception, safe sexual practices [Pregnancy Options] : pregnancy options [HPV Vaccine] : HPV Vaccine [Lab Results] : lab results

## 2023-07-11 ENCOUNTER — APPOINTMENT (OUTPATIENT)
Dept: OBGYN | Facility: HOSPITAL | Age: 33
End: 2023-07-11

## 2023-12-20 ENCOUNTER — APPOINTMENT (OUTPATIENT)
Dept: OBGYN | Facility: HOSPITAL | Age: 33
End: 2023-12-20

## 2024-05-03 NOTE — CONSULT NOTE ADULT - PROBLEM SELECTOR RECOMMENDATION 9
Dr. Milligan, patient is coming in on 5/17 for her labs. There is a lipid panel ordered, but she just had one in March. Do you still want this? Do you want anything else?    - Recommend outpatient f/u in clinic early next week to establish care and assess viability of pregnancy  - Rh type: positive. No vaginal bleeding.  No need for rhogam at this time.   - ED return precautions discussed  - Patient stable for d/c home from GYN perspective.  Primary management per ED team.      GHASSAN No PGY2  d/w Johann - Recommend outpatient f/u in clinic early next week to establish care and assess viability of pregnancy, clinic has been emailed to facilitate prompt scheduling  - Rh type: positive. No vaginal bleeding.  No need for rhogam at this time.   - ED return precautions discussed  - Patient stable for d/c home from GYN perspective.  Primary management per ED team.      GHASSAN No PGY2  d/w Johann

## 2024-08-09 PROBLEM — Z78.9 OTHER SPECIFIED HEALTH STATUS: Chronic | Status: ACTIVE | Noted: 2021-06-03

## 2024-08-14 ENCOUNTER — APPOINTMENT (OUTPATIENT)
Dept: INTERNAL MEDICINE | Facility: CLINIC | Age: 34
End: 2024-08-14

## 2025-02-07 ENCOUNTER — LABORATORY RESULT (OUTPATIENT)
Age: 35
End: 2025-02-07

## 2025-02-07 ENCOUNTER — APPOINTMENT (OUTPATIENT)
Dept: INTERNAL MEDICINE | Facility: CLINIC | Age: 35
End: 2025-02-07
Payer: MEDICAID

## 2025-02-07 ENCOUNTER — OUTPATIENT (OUTPATIENT)
Dept: OUTPATIENT SERVICES | Facility: HOSPITAL | Age: 35
LOS: 1 days | End: 2025-02-07

## 2025-02-07 VITALS
DIASTOLIC BLOOD PRESSURE: 75 MMHG | SYSTOLIC BLOOD PRESSURE: 111 MMHG | OXYGEN SATURATION: 99 % | WEIGHT: 139 LBS | HEIGHT: 63 IN | BODY MASS INDEX: 24.63 KG/M2 | HEART RATE: 67 BPM

## 2025-02-07 DIAGNOSIS — E03.8 OTHER SPECIFIED HYPOTHYROIDISM: ICD-10-CM

## 2025-02-07 DIAGNOSIS — M54.50 LOW BACK PAIN, UNSPECIFIED: ICD-10-CM

## 2025-02-07 DIAGNOSIS — M25.562 PAIN IN RIGHT KNEE: ICD-10-CM

## 2025-02-07 DIAGNOSIS — G89.29 PAIN IN RIGHT KNEE: ICD-10-CM

## 2025-02-07 DIAGNOSIS — Z23 ENCOUNTER FOR IMMUNIZATION: ICD-10-CM

## 2025-02-07 DIAGNOSIS — M25.561 PAIN IN RIGHT KNEE: ICD-10-CM

## 2025-02-07 PROCEDURE — 99395 PREV VISIT EST AGE 18-39: CPT | Mod: 25

## 2025-02-10 DIAGNOSIS — M25.561 PAIN IN RIGHT KNEE: ICD-10-CM

## 2025-02-10 DIAGNOSIS — E03.8 OTHER SPECIFIED HYPOTHYROIDISM: ICD-10-CM

## 2025-02-10 DIAGNOSIS — M54.50 LOW BACK PAIN, UNSPECIFIED: ICD-10-CM

## 2025-02-10 DIAGNOSIS — Z23 ENCOUNTER FOR IMMUNIZATION: ICD-10-CM

## 2025-02-10 DIAGNOSIS — Z00.00 ENCOUNTER FOR GENERAL ADULT MEDICAL EXAMINATION WITHOUT ABNORMAL FINDINGS: ICD-10-CM

## 2025-02-10 LAB
24R-OH-CALCIDIOL SERPL-MCNC: 66 PG/ML
ALBUMIN SERPL ELPH-MCNC: 5 G/DL
ALP BLD-CCNC: 79 U/L
ALT SERPL-CCNC: 10 U/L
ANION GAP SERPL CALC-SCNC: 13 MMOL/L
AST SERPL-CCNC: 17 U/L
BASOPHILS # BLD AUTO: 0.04 K/UL
BASOPHILS NFR BLD AUTO: 0.5 %
BILIRUB SERPL-MCNC: 1.2 MG/DL
BUN SERPL-MCNC: 14 MG/DL
CALCIUM SERPL-MCNC: 9.9 MG/DL
CHLORIDE SERPL-SCNC: 104 MMOL/L
CHOLEST SERPL-MCNC: 242 MG/DL
CO2 SERPL-SCNC: 24 MMOL/L
CREAT SERPL-MCNC: 0.64 MG/DL
EGFR: 117 ML/MIN/1.73M2
EOSINOPHIL # BLD AUTO: 0.09 K/UL
EOSINOPHIL NFR BLD AUTO: 1 %
ESTIMATED AVERAGE GLUCOSE: 105 MG/DL
FERRITIN SERPL-MCNC: 28 NG/ML
GLUCOSE SERPL-MCNC: 91 MG/DL
HBA1C MFR BLD HPLC: 5.3 %
HCT VFR BLD CALC: 43.7 %
HDLC SERPL-MCNC: 64 MG/DL
HGB BLD-MCNC: 14.4 G/DL
IMM GRANULOCYTES NFR BLD AUTO: 0.2 %
IRON SATN MFR SERPL: 17 %
IRON SERPL-MCNC: 50 UG/DL
LDLC SERPL CALC-MCNC: 154 MG/DL
LYMPHOCYTES # BLD AUTO: 3.26 K/UL
LYMPHOCYTES NFR BLD AUTO: 37.4 %
MAN DIFF?: NORMAL
MCHC RBC-ENTMCNC: 27.3 PG
MCHC RBC-ENTMCNC: 33 G/DL
MCV RBC AUTO: 82.9 FL
MONOCYTES # BLD AUTO: 0.43 K/UL
MONOCYTES NFR BLD AUTO: 4.9 %
NEUTROPHILS # BLD AUTO: 4.87 K/UL
NEUTROPHILS NFR BLD AUTO: 56 %
NONHDLC SERPL-MCNC: 178 MG/DL
PLATELET # BLD AUTO: 287 K/UL
POTASSIUM SERPL-SCNC: 4.4 MMOL/L
PROT SERPL-MCNC: 7.6 G/DL
RBC # BLD: 5.27 M/UL
RBC # FLD: 13.9 %
SODIUM SERPL-SCNC: 141 MMOL/L
TIBC SERPL-MCNC: 300 UG/DL
TRIGL SERPL-MCNC: 133 MG/DL
TSH SERPL-ACNC: 5.54 UIU/ML
UIBC SERPL-MCNC: 249 UG/DL
WBC # FLD AUTO: 8.71 K/UL

## 2025-04-02 PROBLEM — Z34.01 ENCOUNTER FOR PRENATAL CARE IN FIRST TRIMESTER OF FIRST PREGNANCY: Status: RESOLVED | Noted: 2020-06-02 | Resolved: 2025-04-02

## 2025-05-05 NOTE — ED PROVIDER NOTE - PROGRESS NOTE
New pt with positive at home UPT. Based on LMP 2/28/25 CORNELIA is 12/5/25. Approximately 9w3d gestation. G3 with Hx of SAB.Pt reports n/v and cramping but denies vaginal bleeding/spotting.      During this visit, pt chart was reviewed and updated with patient including but not limited to demographics, Allergies, medications, pharmacy, medical/family history and OB history updated.     Pregnancy confirmation, initial OB and first routine OB appts scheduled.      Patient was guided through expectations of care during pregnancy and office visits.    Will need a need urine specimen - check for infection, glucose, protein  pap if due, swabs to test for infections-GC & CT, yeast  will order PN labs > bld type, CBC, check for infections  will discuss genetic [id number] done at 10 weeks gestation    Message was sent to [facility] staff to schedule and coordinating dating u/s.      arrive a few mins early  check in w/  reception  allowed 2 adult guests-NO CHILDREN  come with full bladder-vag u/s  no photos/videos  dating u/s performed at 8-10wks gestation      Discussed with pt and Provided Pregnancy Education    Mild cramping/spotting may be normal aurelio in 1st tri & with sexual activity  encouraged to go to ED if saturating a pad with bright red blood and is associated with painful cramping/abd pain   Nausea/vomiting is normal during the 1st trimester  try eating small, frequent meals   Breast changes during early pregnancy  breasts may feel full/tender and may even leak  Diet  Healthy well-balanced diet including fruits, vegetable, protein, and carbs  No raw foods/sushi   Meat must be cooked well done  Avoid deli meats & hot dogs unless steaming hot prior to eating!  Wash fruits/veggies thoroughly.   Avoid soft non pasteurized cheeses > brie, Gouda, feta, blue cheese, queso  Avoid fish high in mercury > bigeye tuna, malgorzata mackerel, marlin, orange roughy, shark, swordfish, tilefish  Limit canned tuna to 1x/wk  Fish  lower in mercury > sardines, salmon, cod, catfish, tilapia, herring, trout, anchovies, sardines  Fluids   8-12 cups of water/day  Limit caffeine to <200mg/day (approx. one 12oz cup of coffee)   Vitamins  PNV with folic acid ~ (ex: One a Day Woman's PN Vit-gel capsule-no taste-helpful if have n/v)  Rest   Feeling tired is normal; Charisse during the 1st trimester  Take frequent breaks at home and at work.  Sleeping 8-12 hrs at night  Sleeping on your side rather than back or abdomen, especially as pregnancy progresses  Normal weight gain  1st trimester 5-7lbs or none at all  25-35 lbs is expected throughout the entire pregnancy  Exercise is ok to continue but don't start anything new or more strenuous   Alcohol, smoking, vaping, drugs, herbal supplements should be avoided.  Always check with your prescribing MD or OB/GYN before starting or stopping any medication  OTC Pregnancy Safe Medications will be sent via portal.   Avoid changing the cat litter box  Wear gloves with gardening   Important to wear seat belt in vehicle  making sure the bottom belt is tucked below the belly  Breastfeeding > benefits & recommendations  Begin to look for Pediatrician- baby's is to be seen within 2-3 days of d/c  Will need Infant car seat to bring baby home from hospital.     Advised Patient a message would be Sent to portal with information regarding the [email or web address] for access to the Pregnancy A to Z guide and Prenatal Class schedule. Informed of the ConnectedMOM program, Get Ready to Meet Your Baby pamphlet, Coffective jarocho and how to obtain a breast pump through insurance toward end of pregnancy as well as OTC pregnancy safe medications.     Education provided & questions answered. Pt verbalized understanding of prenatal education reviewed during this visit. All questions were answered and pt had no further questions. 30 minutes-Total time spent with pt during this visit.       Stable.